# Patient Record
Sex: MALE | Race: WHITE | Employment: OTHER | URBAN - METROPOLITAN AREA
[De-identification: names, ages, dates, MRNs, and addresses within clinical notes are randomized per-mention and may not be internally consistent; named-entity substitution may affect disease eponyms.]

---

## 2017-11-29 ENCOUNTER — HOSPITAL ENCOUNTER (OUTPATIENT)
Dept: GENERAL RADIOLOGY | Age: 72
Discharge: HOME | End: 2017-11-29
Payer: MEDICARE

## 2017-11-29 ENCOUNTER — HOSPITAL ENCOUNTER (OUTPATIENT)
Dept: PREADMISSION TESTING | Age: 72
Discharge: HOME | End: 2017-11-29
Payer: MEDICARE

## 2017-11-29 VITALS
RESPIRATION RATE: 17 BRPM | SYSTOLIC BLOOD PRESSURE: 123 MMHG | BODY MASS INDEX: 29.9 KG/M2 | DIASTOLIC BLOOD PRESSURE: 61 MMHG | WEIGHT: 197.31 LBS | HEIGHT: 68 IN | TEMPERATURE: 97.5 F | OXYGEN SATURATION: 96 % | HEART RATE: 64 BPM

## 2017-11-29 LAB
-: NORMAL
ABO/RH: NORMAL
ABSOLUTE EOS #: 0.1 K/UL (ref 0–0.4)
ABSOLUTE IMMATURE GRANULOCYTE: ABNORMAL K/UL (ref 0–0.3)
ABSOLUTE LYMPH #: 1.4 K/UL (ref 1–4.8)
ABSOLUTE MONO #: 0.6 K/UL (ref 0.2–0.8)
AMORPHOUS: NORMAL
ANION GAP SERPL CALCULATED.3IONS-SCNC: 13 MMOL/L (ref 9–17)
ANTIBODY SCREEN: NEGATIVE
ARM BAND NUMBER: NORMAL
BACTERIA: NORMAL
BASOPHILS # BLD: 0 % (ref 0–2)
BASOPHILS ABSOLUTE: 0 K/UL (ref 0–0.2)
BILIRUBIN URINE: NEGATIVE
BUN BLDV-MCNC: 20 MG/DL (ref 8–23)
BUN/CREAT BLD: 20 (ref 9–20)
CALCIUM SERPL-MCNC: 9.2 MG/DL (ref 8.6–10.4)
CASTS UA: NORMAL /LPF
CHLORIDE BLD-SCNC: 102 MMOL/L (ref 98–107)
CO2: 23 MMOL/L (ref 20–31)
COLOR: YELLOW
COMMENT UA: ABNORMAL
CREAT SERPL-MCNC: 1 MG/DL (ref 0.7–1.2)
CRYSTALS, UA: NORMAL /HPF
DIFFERENTIAL TYPE: ABNORMAL
EKG ATRIAL RATE: 52 BPM
EKG P AXIS: 46 DEGREES
EKG P-R INTERVAL: 162 MS
EKG Q-T INTERVAL: 422 MS
EKG QRS DURATION: 90 MS
EKG QTC CALCULATION (BAZETT): 392 MS
EKG R AXIS: 40 DEGREES
EKG T AXIS: 41 DEGREES
EKG VENTRICULAR RATE: 52 BPM
EOSINOPHILS RELATIVE PERCENT: 2 % (ref 1–4)
EPITHELIAL CELLS UA: NORMAL /HPF (ref 0–5)
EXPIRATION DATE: NORMAL
GFR AFRICAN AMERICAN: >60 ML/MIN
GFR NON-AFRICAN AMERICAN: >60 ML/MIN
GFR SERPL CREATININE-BSD FRML MDRD: ABNORMAL ML/MIN/{1.73_M2}
GFR SERPL CREATININE-BSD FRML MDRD: ABNORMAL ML/MIN/{1.73_M2}
GLUCOSE BLD-MCNC: 139 MG/DL (ref 70–99)
GLUCOSE URINE: NEGATIVE
HCT VFR BLD CALC: 51.3 % (ref 41–53)
HEMOGLOBIN: 17.3 G/DL (ref 13.5–17.5)
IMMATURE GRANULOCYTES: ABNORMAL %
INR BLD: 1
KETONES, URINE: NEGATIVE
LEUKOCYTE ESTERASE, URINE: NEGATIVE
LYMPHOCYTES # BLD: 21 % (ref 24–44)
MCH RBC QN AUTO: 29.8 PG (ref 26–34)
MCHC RBC AUTO-ENTMCNC: 33.8 G/DL (ref 31–37)
MCV RBC AUTO: 88.1 FL (ref 80–100)
MONOCYTES # BLD: 9 % (ref 1–7)
MRSA, DNA, NASAL: NORMAL
MUCUS: NORMAL
NITRITE, URINE: NEGATIVE
OTHER OBSERVATIONS UA: NORMAL
PARTIAL THROMBOPLASTIN TIME: 25.9 SEC (ref 23–31)
PDW BLD-RTO: 13.6 % (ref 11.5–14.5)
PH UA: 6 (ref 5–8)
PLATELET # BLD: 186 K/UL (ref 130–400)
PLATELET ESTIMATE: ABNORMAL
PMV BLD AUTO: 8.1 FL (ref 6–12)
POTASSIUM SERPL-SCNC: 3.3 MMOL/L (ref 3.7–5.3)
PREALBUMIN: 28.5 MG/DL (ref 20–40)
PROTEIN UA: NEGATIVE
PROTHROMBIN TIME: 10.6 SEC (ref 9.7–11.6)
RBC # BLD: 5.82 M/UL (ref 4.5–5.9)
RBC # BLD: ABNORMAL 10*6/UL
RBC UA: NORMAL /HPF (ref 0–2)
RENAL EPITHELIAL, UA: NORMAL /HPF
SEG NEUTROPHILS: 68 % (ref 36–66)
SEGMENTED NEUTROPHILS ABSOLUTE COUNT: 4.5 K/UL (ref 1.8–7.7)
SODIUM BLD-SCNC: 138 MMOL/L (ref 135–144)
SPECIFIC GRAVITY UA: 1.01 (ref 1–1.03)
SPECIMEN DESCRIPTION: NORMAL
TRICHOMONAS: NORMAL
TURBIDITY: CLEAR
URINE HGB: ABNORMAL
UROBILINOGEN, URINE: NORMAL
WBC # BLD: 6.6 K/UL (ref 3.5–11)
WBC # BLD: ABNORMAL 10*3/UL
WBC UA: NORMAL /HPF (ref 0–5)
YEAST: NORMAL

## 2017-11-29 PROCEDURE — 84134 ASSAY OF PREALBUMIN: CPT

## 2017-11-29 PROCEDURE — 87641 MR-STAPH DNA AMP PROBE: CPT

## 2017-11-29 PROCEDURE — 87086 URINE CULTURE/COLONY COUNT: CPT

## 2017-11-29 PROCEDURE — 86850 RBC ANTIBODY SCREEN: CPT

## 2017-11-29 PROCEDURE — 85025 COMPLETE CBC W/AUTO DIFF WBC: CPT

## 2017-11-29 PROCEDURE — 80048 BASIC METABOLIC PNL TOTAL CA: CPT

## 2017-11-29 PROCEDURE — 36415 COLL VENOUS BLD VENIPUNCTURE: CPT

## 2017-11-29 PROCEDURE — 71020 XR CHEST STANDARD TWO VW: CPT

## 2017-11-29 PROCEDURE — 85730 THROMBOPLASTIN TIME PARTIAL: CPT

## 2017-11-29 PROCEDURE — 85610 PROTHROMBIN TIME: CPT

## 2017-11-29 PROCEDURE — 81001 URINALYSIS AUTO W/SCOPE: CPT

## 2017-11-29 PROCEDURE — 86900 BLOOD TYPING SEROLOGIC ABO: CPT

## 2017-11-29 PROCEDURE — 86901 BLOOD TYPING SEROLOGIC RH(D): CPT

## 2017-11-29 PROCEDURE — 93005 ELECTROCARDIOGRAM TRACING: CPT

## 2017-11-29 RX ORDER — FENOFIBRATE 134 MG/1
134 CAPSULE ORAL
COMMUNITY

## 2017-11-29 RX ORDER — ESOMEPRAZOLE MAGNESIUM 40 MG/1
40 FOR SUSPENSION ORAL DAILY
COMMUNITY

## 2017-11-29 RX ORDER — OLMESARTAN MEDOXOMIL, AMLODIPINE AND HYDROCHLOROTHIAZIDE TABLET 40/10/25 MG 40; 10; 25 MG/1; MG/1; MG/1
40 TABLET ORAL
COMMUNITY

## 2017-11-29 NOTE — H&P
feeling in head. Cloudy feeling.  Vicodin [Hydrocodone-Acetaminophen] Other (See Comments)     Cloudiness, does not like feeling. ROS:    GENERAL HEALTH:  Denies any problems with weight, appetite, or sleep. Skin:  No itching or rashes. No lesions. No easy bruising or bleeding. HEENT:  Denies cephalgia or head injury. No eye or ear pain. No sinusitis, rhinorhea or epistaxis. No frequent sorethroat, dysphagia or hoarseness. No masses, pain, stiffness or injury to the neck. Cardio-Respiratory: No hemoptysis, shortness of breath, chest pain, dizziness, orthopnea or palpitations. Gastrointestinal:  No constipation, diarrhea, hiwot stools, red or black in the stool. No nausea or vomiting. Genitourinary:  No dysuria, hematuria, discharge, incontinence. No recent urinary tract infection. Locomotor:  See HPI  No need for assistance with ambulation. Neuro:  Denies  neuropathy, syncopal episodes, weakness, vertigo, arthralgia, myalgia or numbness or tingling. Behavioral/Psych:  Pt denies current feeling of depression or significant anxiety. GENERAL PHYSICAL EXAM:            Vitals: /61   Pulse 64   Temp 97.5 °F (36.4 °C) (Oral)   Resp 17   Ht 5' 8\" (1.727 m)   Wt 197 lb 5 oz (89.5 kg)   SpO2 96%   BMI 30.00 kg/m²  Body mass index is 30 kg/m². GENERAL APPEARANCE:  Kellen Caruso is a 67 y.o. male,  nourished, conscious, alert. Does not appear to be in distress or pain at this time. Skin:  Appears warm and dry without jaundice or cyanosis. No rashes or lesions. HEENT:  No facial swelling or tenderness. No  scleral icterus. No drainage from the ears, eyes or nose. Moist mucous membranes. No jugular vein distention. Carotid pulses present without bruit. Chest:  Symmetrical with equal expansion. Heart:  Regular rate and rhythm without murmur or rub. No extra heart sounds.     Lungs: Clear to ausculation without

## 2017-11-29 NOTE — PRE-PROCEDURE INSTRUCTIONS
ARRIVE AT [facility] ON Tuesday, 12/12/17 at 5:30 AM    Once you enter the hospital lobby, take the elevators to the second floor. Check-In is at the surgery registration desk. If you have been given a blood band, you must bring it with you the day of surgery. Continue to take your home medications as you normally do up to and including the night before surgery with the exception of any blood thinning medications. Please stop any blood thinning medications as directed by your surgeon or prescribing physician. Failure to stop certain medications may interfere with your scheduled surgery. These may include:  Aspirin, Warfarin (Coumadin), Clopidogrel (Plavix), Ibuprofen (Motrin, Advil), Naproxen (Aleve), Meloxicam (Mobic), Celecoxib (Celebrex), Eliquis, Pradaxa, Xarelto, Effient, Fish Oil, Herbal supplements. If you are diabetic, do not take any of your diabetic medications by mouth the morning of surgery. If you are taking insulin contact the doctor that manages your diabetes for instructions about any changes to your insulin dosages the day before surgery. Do not inject insulin or other injectable diabetic medications the morning of surgery unless otherwise instructed by the doctor who manages your diabetes. Please take the following medication(s) the day of surgery with a small sip of water:  Nexium. Tylenol is okay. Please use your inhalers at home the day of surgery. PREPARING FOR YOUR SURGERY:     Before surgery, you can play an important role in your own health. Because skin is not sterile, we need to be sure that your skin is as free of germs as possible before surgery by carefully washing before surgery. Preparing or prepping skin before surgery can reduce the risk of a surgical site infection.   Do not shave the area of your body where your surgery will be performed unless you received specific permission from your physician.     You will need to shower at home the night medications you take, along with the dose of the medications and how often you take it. If more convenient bring the pharmacy bottles in a zip lock bag.  Brush your teeth but do not swallow water.  Bring your eyeglasses and case with you. No contacts are to be worn the day of surgery. You also may bring your hearing aids.  If you are on C-PAP or Bi-PAP at home and plan on staying in the hospital overnight for your surgery please bring the machine with you. · Do not wear any jewelry or body piercings day of surgery. Also, NO lotion, perfume or deodorant to be used the day of surgery. No nail polish on the operative extremity (arm/leg surgeries)    · Do not bring any valuables such as jewelry, cash, or credit cards. If you are staying overnight with us, please bring a small bag of personal items.  Please wear loose, comfortable clothing. If you are potentially going to have a cast or brace bring clothing that will fit over them.  In case of illness - If you have cold or flu like symptoms (high fever, runny nose, sore throat, cough, etc.) rash, nausea, vomiting, loose stools, and/or recent contact with someone who has a contagious disease (chicken pox, measles, etc.) Please call your doctor before coming to the hospital.     If your child is having surgery please make arrangements for any other children to be cared for at home on the day of surgery. Other children are not permitted in recovery room and we want you to be able to spend time with the patient. If other arrangements are not available then we suggest that you have a second adult to stay in the waiting room. Day of Surgery/Procedure:    As a patient at [facility] you can expect quality medical and nursing care that is centered on your individual needs.   Our goal is to make your surgical experience as

## 2017-11-30 ENCOUNTER — ANESTHESIA EVENT (OUTPATIENT)
Dept: OPERATING ROOM | Age: 72
DRG: 470 | End: 2017-11-30
Payer: MEDICARE

## 2017-11-30 LAB
CULTURE: NORMAL
CULTURE: NORMAL
Lab: NORMAL
SPECIMEN DESCRIPTION: NORMAL
SPECIMEN DESCRIPTION: NORMAL
STATUS: NORMAL

## 2017-12-01 ASSESSMENT — KOOS JR
STANDING UPRIGHT: 1
TWISING OR PIVOTING ON KNEE: 1
STRAIGHTENING KNEE FULLY: 0
RISING FROM SITTING: 1
GOING UP OR DOWN STAIRS: 1
HOW SEVERE IS YOUR KNEE STIFFNESS AFTER FIRST WAKING IN MORNING: 1
BENDING TO THE FLOOR TO PICK UP OBJECT: 0

## 2017-12-01 ASSESSMENT — PROMIS GLOBAL HEALTH SCALE
TO WHAT EXTENT ARE YOU ABLE TO CARRY OUT YOUR EVERYDAY PHYSICAL ACTIVITIES SUCH AS WALKING, CLIMBING STAIRS, CARRYING GROCERIES, OR MOVING A CHAIR [ON A SCALE OF 1 (NOT AT ALL) TO 5 (COMPLETELY)]?: 5
WHO IS THE PERSON COMPLETING THE PROMIS V1.1 SURVEY?: 0
IN GENERAL, HOW WOULD YOU RATE YOUR PHYSICAL HEALTH [ON A SCALE OF 1 (POOR) TO 5 (EXCELLENT)]?: 4
IN THE PAST 7 DAYS, HOW WOULD YOU RATE YOUR FATIGUE ON AVERAGE [ON A SCALE FROM 1 (NONE) TO 5 (VERY SEVERE)]?: 4
IN GENERAL, HOW WOULD YOU RATE YOUR SATISFACTION WITH YOUR SOCIAL ACTIVITIES AND RELATIONSHIPS [ON A SCALE OF 1 (POOR) TO 5 (EXCELLENT)]?: 4
SUM OF RESPONSES TO QUESTIONS 3, 6, 7, & 8: 17
IN GENERAL, HOW WOULD YOU RATE YOUR MENTAL HEALTH, INCLUDING YOUR MOOD AND YOUR ABILITY TO THINK [ON A SCALE OF 1 (POOR) TO 5 (EXCELLENT)]?: 4
IN THE PAST 7 DAYS, HOW WOULD YOU RATE YOUR PAIN ON AVERAGE [ON A SCALE FROM 0 (NO PAIN) TO 10 (WORST IMAGINABLE PAIN)]?: 4
HOW IS THE PROMIS V1.1 BEING ADMINISTERED?: 0
IN GENERAL, WOULD YOU SAY YOUR HEALTH IS...[ON A SCALE OF 1 (POOR) TO 5 (EXCELLENT)]: 4
IN GENERAL, PLEASE RATE HOW WELL YOU CARRY OUT YOUR USUAL SOCIAL ACTIVITIES (INCLUDES ACTIVITIES AT HOME, AT WORK, AND IN YOUR COMMUNITY, AND RESPONSIBILITIES AS A PARENT, CHILD, SPOUSE, EMPLOYEE, FRIEND, ETC) [ON A SCALE OF 1 (POOR) TO 5 (EXCELLENT)]?: 4
IN GENERAL, WOULD YOU SAY YOUR QUALITY OF LIFE IS...[ON A SCALE OF 1 (POOR) TO 5 (EXCELLENT)]: 4
IN THE PAST 7 DAYS, HOW OFTEN HAVE YOU BEEN BOTHERED BY EMOTIONAL PROBLEMS, SUCH AS FEELING ANXIOUS, DEPRESSED, OR IRRITABLE [ON A SCALE FROM 1 (NEVER) TO 5 (ALWAYS)]?: 2
SUM OF RESPONSES TO QUESTIONS 2, 4, 5, & 10: 14

## 2017-12-12 ENCOUNTER — HOSPITAL ENCOUNTER (INPATIENT)
Age: 72
LOS: 2 days | Discharge: HOME | DRG: 470 | End: 2017-12-14
Attending: ORTHOPAEDIC SURGERY | Admitting: ORTHOPAEDIC SURGERY
Payer: MEDICARE

## 2017-12-12 ENCOUNTER — APPOINTMENT (OUTPATIENT)
Dept: GENERAL RADIOLOGY | Age: 72
DRG: 470 | End: 2017-12-12
Attending: ORTHOPAEDIC SURGERY
Payer: MEDICARE

## 2017-12-12 ENCOUNTER — ANESTHESIA (OUTPATIENT)
Dept: OPERATING ROOM | Age: 72
DRG: 470 | End: 2017-12-12
Payer: MEDICARE

## 2017-12-12 VITALS — SYSTOLIC BLOOD PRESSURE: 114 MMHG | DIASTOLIC BLOOD PRESSURE: 64 MMHG | OXYGEN SATURATION: 96 % | TEMPERATURE: 96.8 F

## 2017-12-12 PROBLEM — Z96.652 S/P TOTAL KNEE ARTHROPLASTY, LEFT: Status: ACTIVE | Noted: 2017-12-12

## 2017-12-12 LAB — POTASSIUM SERPL-SCNC: 4.1 MMOL/L (ref 3.7–5.3)

## 2017-12-12 PROCEDURE — 97162 PT EVAL MOD COMPLEX 30 MIN: CPT

## 2017-12-12 PROCEDURE — 0SRD069 REPLACEMENT OF LEFT KNEE JOINT WITH OXIDIZED ZIRCONIUM ON POLYETHYLENE SYNTHETIC SUBSTITUTE, CEMENTED, OPEN APPROACH: ICD-10-PCS | Performed by: ORTHOPAEDIC SURGERY

## 2017-12-12 PROCEDURE — 84132 ASSAY OF SERUM POTASSIUM: CPT

## 2017-12-12 PROCEDURE — G8979 MOBILITY GOAL STATUS: HCPCS

## 2017-12-12 PROCEDURE — 2580000003 HC RX 258: Performed by: STUDENT IN AN ORGANIZED HEALTH CARE EDUCATION/TRAINING PROGRAM

## 2017-12-12 PROCEDURE — C1776 JOINT DEVICE (IMPLANTABLE): HCPCS | Performed by: ORTHOPAEDIC SURGERY

## 2017-12-12 PROCEDURE — 97165 OT EVAL LOW COMPLEX 30 MIN: CPT

## 2017-12-12 PROCEDURE — 99232 SBSQ HOSP IP/OBS MODERATE 35: CPT | Performed by: INTERNAL MEDICINE

## 2017-12-12 PROCEDURE — 64448 NJX AA&/STRD FEM NRV NFS IMG: CPT | Performed by: ANESTHESIOLOGY

## 2017-12-12 PROCEDURE — 6370000000 HC RX 637 (ALT 250 FOR IP): Performed by: ANESTHESIOLOGY

## 2017-12-12 PROCEDURE — 2720000010 HC SURG SUPPLY STERILE: Performed by: ORTHOPAEDIC SURGERY

## 2017-12-12 PROCEDURE — 97535 SELF CARE MNGMENT TRAINING: CPT

## 2017-12-12 PROCEDURE — 3700000001 HC ADD 15 MINUTES (ANESTHESIA): Performed by: ORTHOPAEDIC SURGERY

## 2017-12-12 PROCEDURE — 6360000002 HC RX W HCPCS: Performed by: ANESTHESIOLOGY

## 2017-12-12 PROCEDURE — 7100000000 HC PACU RECOVERY - FIRST 15 MIN: Performed by: ORTHOPAEDIC SURGERY

## 2017-12-12 PROCEDURE — 2500000003 HC RX 250 WO HCPCS: Performed by: ANESTHESIOLOGY

## 2017-12-12 PROCEDURE — 97530 THERAPEUTIC ACTIVITIES: CPT

## 2017-12-12 PROCEDURE — 2580000003 HC RX 258: Performed by: ANESTHESIOLOGY

## 2017-12-12 PROCEDURE — 3600000005 HC SURGERY LEVEL 5 BASE: Performed by: ORTHOPAEDIC SURGERY

## 2017-12-12 PROCEDURE — 3E0T3BZ INTRODUCTION OF ANESTHETIC AGENT INTO PERIPHERAL NERVES AND PLEXI, PERCUTANEOUS APPROACH: ICD-10-PCS | Performed by: ANESTHESIOLOGY

## 2017-12-12 PROCEDURE — 7100000001 HC PACU RECOVERY - ADDTL 15 MIN: Performed by: ORTHOPAEDIC SURGERY

## 2017-12-12 PROCEDURE — C1713 ANCHOR/SCREW BN/BN,TIS/BN: HCPCS | Performed by: ORTHOPAEDIC SURGERY

## 2017-12-12 PROCEDURE — 3700000000 HC ANESTHESIA ATTENDED CARE: Performed by: ORTHOPAEDIC SURGERY

## 2017-12-12 PROCEDURE — A4364 ADHESIVE, LIQUID OR EQUAL: HCPCS | Performed by: ORTHOPAEDIC SURGERY

## 2017-12-12 PROCEDURE — 6360000002 HC RX W HCPCS: Performed by: ORTHOPAEDIC SURGERY

## 2017-12-12 PROCEDURE — 3600000015 HC SURGERY LEVEL 5 ADDTL 15MIN: Performed by: ORTHOPAEDIC SURGERY

## 2017-12-12 PROCEDURE — 6370000000 HC RX 637 (ALT 250 FOR IP): Performed by: STUDENT IN AN ORGANIZED HEALTH CARE EDUCATION/TRAINING PROGRAM

## 2017-12-12 PROCEDURE — G8987 SELF CARE CURRENT STATUS: HCPCS

## 2017-12-12 PROCEDURE — 1200000000 HC SEMI PRIVATE

## 2017-12-12 PROCEDURE — 73560 X-RAY EXAM OF KNEE 1 OR 2: CPT

## 2017-12-12 PROCEDURE — 2580000003 HC RX 258: Performed by: ORTHOPAEDIC SURGERY

## 2017-12-12 PROCEDURE — 6370000000 HC RX 637 (ALT 250 FOR IP): Performed by: INTERNAL MEDICINE

## 2017-12-12 PROCEDURE — 6360000002 HC RX W HCPCS: Performed by: STUDENT IN AN ORGANIZED HEALTH CARE EDUCATION/TRAINING PROGRAM

## 2017-12-12 PROCEDURE — G8978 MOBILITY CURRENT STATUS: HCPCS

## 2017-12-12 PROCEDURE — G8988 SELF CARE GOAL STATUS: HCPCS

## 2017-12-12 PROCEDURE — 97110 THERAPEUTIC EXERCISES: CPT

## 2017-12-12 DEVICE — JOURNEY II BCS FEMORAL OXINIUM                                    LEFT SIZE 6
Type: IMPLANTABLE DEVICE | Site: KNEE | Status: FUNCTIONAL
Brand: JOURNEY

## 2017-12-12 DEVICE — JOURNEY II BCS XLPE ARTICULAR                                    INSERT SIZE 5-6 LEFT 10MM
Type: IMPLANTABLE DEVICE | Site: KNEE | Status: FUNCTIONAL
Brand: JOURNEY

## 2017-12-12 DEVICE — GENESIS II OVAL RESURFACING                                    PATELLAR 35MM
Type: IMPLANTABLE DEVICE | Site: KNEE | Status: FUNCTIONAL
Brand: GENESIS II

## 2017-12-12 DEVICE — CEMENT BNE 40GM FULL DOSE PMMA W/O ANTIBIO HI VISC N RADPQ: Type: IMPLANTABLE DEVICE | Site: KNEE | Status: FUNCTIONAL

## 2017-12-12 DEVICE — JOURNEY TIBIAL BASEPLATE NONPOROUS                                    LEFT SIZE 6
Type: IMPLANTABLE DEVICE | Site: KNEE | Status: FUNCTIONAL
Brand: JOURNEY

## 2017-12-12 RX ORDER — OLMESARTAN MEDOXOMIL, AMLODIPINE AND HYDROCHLOROTHIAZIDE TABLET 40/10/25 MG 40; 10; 25 MG/1; MG/1; MG/1
40 TABLET ORAL 2 TIMES DAILY
Status: DISCONTINUED | OUTPATIENT
Start: 2017-12-12 | End: 2017-12-12

## 2017-12-12 RX ORDER — GLYCOPYRROLATE 0.2 MG/ML
INJECTION INTRAMUSCULAR; INTRAVENOUS PRN
Status: DISCONTINUED | OUTPATIENT
Start: 2017-12-12 | End: 2017-12-12 | Stop reason: SDUPTHER

## 2017-12-12 RX ORDER — SODIUM CHLORIDE, SODIUM LACTATE, POTASSIUM CHLORIDE, CALCIUM CHLORIDE 600; 310; 30; 20 MG/100ML; MG/100ML; MG/100ML; MG/100ML
INJECTION, SOLUTION INTRAVENOUS CONTINUOUS
Status: DISCONTINUED | OUTPATIENT
Start: 2017-12-13 | End: 2017-12-12

## 2017-12-12 RX ORDER — GABAPENTIN 300 MG/1
300 CAPSULE ORAL ONCE
Status: COMPLETED | OUTPATIENT
Start: 2017-12-12 | End: 2017-12-12

## 2017-12-12 RX ORDER — ROPIVACAINE HYDROCHLORIDE 5 MG/ML
INJECTION, SOLUTION EPIDURAL; INFILTRATION; PERINEURAL PRN
Status: DISCONTINUED | OUTPATIENT
Start: 2017-12-12 | End: 2017-12-12 | Stop reason: SDUPTHER

## 2017-12-12 RX ORDER — HYDRALAZINE HYDROCHLORIDE 20 MG/ML
5 INJECTION INTRAMUSCULAR; INTRAVENOUS EVERY 10 MIN PRN
Status: DISCONTINUED | OUTPATIENT
Start: 2017-12-12 | End: 2017-12-12 | Stop reason: HOSPADM

## 2017-12-12 RX ORDER — LIDOCAINE HYDROCHLORIDE 20 MG/ML
INJECTION, SOLUTION EPIDURAL; INFILTRATION; INTRACAUDAL; PERINEURAL PRN
Status: DISCONTINUED | OUTPATIENT
Start: 2017-12-12 | End: 2017-12-12 | Stop reason: SDUPTHER

## 2017-12-12 RX ORDER — PROMETHAZINE HYDROCHLORIDE 25 MG/ML
6.25 INJECTION, SOLUTION INTRAMUSCULAR; INTRAVENOUS
Status: DISCONTINUED | OUTPATIENT
Start: 2017-12-12 | End: 2017-12-12 | Stop reason: HOSPADM

## 2017-12-12 RX ORDER — ACETAMINOPHEN 500 MG
1000 TABLET ORAL ONCE
Status: COMPLETED | OUTPATIENT
Start: 2017-12-12 | End: 2017-12-12

## 2017-12-12 RX ORDER — OLMESARTAN MEDOXOMIL, AMLODIPINE AND HYDROCHLOROTHIAZIDE TABLET 40/10/25 MG 40; 10; 25 MG/1; MG/1; MG/1
40 TABLET ORAL DAILY
Status: DISCONTINUED | OUTPATIENT
Start: 2017-12-12 | End: 2017-12-12 | Stop reason: CLARIF

## 2017-12-12 RX ORDER — GABAPENTIN 100 MG/1
100 CAPSULE ORAL 3 TIMES DAILY
Status: DISCONTINUED | OUTPATIENT
Start: 2017-12-12 | End: 2017-12-14 | Stop reason: HOSPADM

## 2017-12-12 RX ORDER — PROPOFOL 10 MG/ML
INJECTION, EMULSION INTRAVENOUS CONTINUOUS PRN
Status: DISCONTINUED | OUTPATIENT
Start: 2017-12-12 | End: 2017-12-12 | Stop reason: SDUPTHER

## 2017-12-12 RX ORDER — AMLODIPINE BESYLATE 10 MG/1
10 TABLET ORAL DAILY
Status: DISCONTINUED | OUTPATIENT
Start: 2017-12-12 | End: 2017-12-14 | Stop reason: HOSPADM

## 2017-12-12 RX ORDER — LABETALOL HYDROCHLORIDE 5 MG/ML
5 INJECTION, SOLUTION INTRAVENOUS EVERY 10 MIN PRN
Status: DISCONTINUED | OUTPATIENT
Start: 2017-12-12 | End: 2017-12-12 | Stop reason: HOSPADM

## 2017-12-12 RX ORDER — ACETAMINOPHEN 500 MG
1000 TABLET ORAL EVERY 6 HOURS
Status: DISCONTINUED | OUTPATIENT
Start: 2017-12-12 | End: 2017-12-14 | Stop reason: HOSPADM

## 2017-12-12 RX ORDER — BISACODYL 10 MG
10 SUPPOSITORY, RECTAL RECTAL DAILY PRN
Status: DISCONTINUED | OUTPATIENT
Start: 2017-12-12 | End: 2017-12-14 | Stop reason: HOSPADM

## 2017-12-12 RX ORDER — METHYLENE BLUE 10 MG/ML
INJECTION INTRAVENOUS PRN
Status: DISCONTINUED | OUTPATIENT
Start: 2017-12-12 | End: 2017-12-12 | Stop reason: HOSPADM

## 2017-12-12 RX ORDER — FENOFIBRATE 160 MG/1
160 TABLET ORAL DAILY
Status: DISCONTINUED | OUTPATIENT
Start: 2017-12-13 | End: 2017-12-14 | Stop reason: HOSPADM

## 2017-12-12 RX ORDER — FENTANYL CITRATE 50 UG/ML
50 INJECTION, SOLUTION INTRAMUSCULAR; INTRAVENOUS
Status: DISCONTINUED | OUTPATIENT
Start: 2017-12-12 | End: 2017-12-14 | Stop reason: HOSPADM

## 2017-12-12 RX ORDER — LIDOCAINE HYDROCHLORIDE 10 MG/ML
1 INJECTION, SOLUTION EPIDURAL; INFILTRATION; INTRACAUDAL; PERINEURAL
Status: DISCONTINUED | OUTPATIENT
Start: 2017-12-13 | End: 2017-12-12 | Stop reason: HOSPADM

## 2017-12-12 RX ORDER — HYDROMORPHONE HCL 110MG/55ML
0.5 PATIENT CONTROLLED ANALGESIA SYRINGE INTRAVENOUS EVERY 5 MIN PRN
Status: DISCONTINUED | OUTPATIENT
Start: 2017-12-12 | End: 2017-12-12 | Stop reason: HOSPADM

## 2017-12-12 RX ORDER — FENTANYL CITRATE 50 UG/ML
25 INJECTION, SOLUTION INTRAMUSCULAR; INTRAVENOUS
Status: DISCONTINUED | OUTPATIENT
Start: 2017-12-12 | End: 2017-12-12

## 2017-12-12 RX ORDER — SODIUM CHLORIDE 9 MG/ML
INJECTION, SOLUTION INTRAVENOUS CONTINUOUS
Status: DISCONTINUED | OUTPATIENT
Start: 2017-12-12 | End: 2017-12-14 | Stop reason: HOSPADM

## 2017-12-12 RX ORDER — SODIUM CHLORIDE 9 MG/ML
INJECTION, SOLUTION INTRAVENOUS CONTINUOUS
Status: DISCONTINUED | OUTPATIENT
Start: 2017-12-13 | End: 2017-12-12

## 2017-12-12 RX ORDER — FENOFIBRATE 134 MG/1
134 CAPSULE ORAL
Status: DISCONTINUED | OUTPATIENT
Start: 2017-12-13 | End: 2017-12-12 | Stop reason: CLARIF

## 2017-12-12 RX ORDER — FENTANYL CITRATE 50 UG/ML
25 INJECTION, SOLUTION INTRAMUSCULAR; INTRAVENOUS EVERY 5 MIN PRN
Status: DISCONTINUED | OUTPATIENT
Start: 2017-12-12 | End: 2017-12-12 | Stop reason: HOSPADM

## 2017-12-12 RX ORDER — SODIUM CHLORIDE 0.9 % (FLUSH) 0.9 %
10 SYRINGE (ML) INJECTION EVERY 12 HOURS SCHEDULED
Status: DISCONTINUED | OUTPATIENT
Start: 2017-12-12 | End: 2017-12-14 | Stop reason: HOSPADM

## 2017-12-12 RX ORDER — METOPROLOL TARTRATE 5 MG/5ML
5 INJECTION INTRAVENOUS EVERY 6 HOURS PRN
Status: DISCONTINUED | OUTPATIENT
Start: 2017-12-12 | End: 2017-12-14 | Stop reason: HOSPADM

## 2017-12-12 RX ORDER — CELECOXIB 200 MG/1
200 CAPSULE ORAL ONCE
Status: COMPLETED | OUTPATIENT
Start: 2017-12-12 | End: 2017-12-12

## 2017-12-12 RX ORDER — LOSARTAN POTASSIUM 100 MG/1
100 TABLET ORAL DAILY
Status: DISCONTINUED | OUTPATIENT
Start: 2017-12-12 | End: 2017-12-14 | Stop reason: HOSPADM

## 2017-12-12 RX ORDER — SODIUM CHLORIDE 0.9 % (FLUSH) 0.9 %
10 SYRINGE (ML) INJECTION EVERY 12 HOURS SCHEDULED
Status: DISCONTINUED | OUTPATIENT
Start: 2017-12-12 | End: 2017-12-12 | Stop reason: HOSPADM

## 2017-12-12 RX ORDER — SODIUM CHLORIDE 0.9 % (FLUSH) 0.9 %
10 SYRINGE (ML) INJECTION PRN
Status: DISCONTINUED | OUTPATIENT
Start: 2017-12-12 | End: 2017-12-14 | Stop reason: HOSPADM

## 2017-12-12 RX ORDER — FENTANYL CITRATE 50 UG/ML
INJECTION, SOLUTION INTRAMUSCULAR; INTRAVENOUS PRN
Status: DISCONTINUED | OUTPATIENT
Start: 2017-12-12 | End: 2017-12-12 | Stop reason: SDUPTHER

## 2017-12-12 RX ORDER — TRANEXAMIC ACID 100 MG/ML
INJECTION, SOLUTION INTRAVENOUS PRN
Status: DISCONTINUED | OUTPATIENT
Start: 2017-12-12 | End: 2017-12-12 | Stop reason: SDUPTHER

## 2017-12-12 RX ORDER — LIDOCAINE HYDROCHLORIDE 10 MG/ML
INJECTION, SOLUTION EPIDURAL; INFILTRATION; INTRACAUDAL; PERINEURAL PRN
Status: DISCONTINUED | OUTPATIENT
Start: 2017-12-12 | End: 2017-12-12 | Stop reason: SDUPTHER

## 2017-12-12 RX ORDER — PROPOFOL 10 MG/ML
INJECTION, EMULSION INTRAVENOUS PRN
Status: DISCONTINUED | OUTPATIENT
Start: 2017-12-12 | End: 2017-12-12 | Stop reason: SDUPTHER

## 2017-12-12 RX ORDER — HYDROCHLOROTHIAZIDE 25 MG/1
25 TABLET ORAL DAILY
Status: DISCONTINUED | OUTPATIENT
Start: 2017-12-12 | End: 2017-12-14 | Stop reason: HOSPADM

## 2017-12-12 RX ORDER — ONDANSETRON 2 MG/ML
4 INJECTION INTRAMUSCULAR; INTRAVENOUS
Status: DISCONTINUED | OUTPATIENT
Start: 2017-12-12 | End: 2017-12-12 | Stop reason: HOSPADM

## 2017-12-12 RX ORDER — CELECOXIB 200 MG/1
200 CAPSULE ORAL 2 TIMES DAILY
Status: DISCONTINUED | OUTPATIENT
Start: 2017-12-12 | End: 2017-12-14 | Stop reason: HOSPADM

## 2017-12-12 RX ORDER — SODIUM CHLORIDE 0.9 % (FLUSH) 0.9 %
10 SYRINGE (ML) INJECTION PRN
Status: DISCONTINUED | OUTPATIENT
Start: 2017-12-12 | End: 2017-12-12 | Stop reason: HOSPADM

## 2017-12-12 RX ORDER — DOCUSATE SODIUM 100 MG/1
100 CAPSULE, LIQUID FILLED ORAL 2 TIMES DAILY
Status: DISCONTINUED | OUTPATIENT
Start: 2017-12-12 | End: 2017-12-14 | Stop reason: HOSPADM

## 2017-12-12 RX ORDER — ONDANSETRON 2 MG/ML
4 INJECTION INTRAMUSCULAR; INTRAVENOUS EVERY 6 HOURS PRN
Status: DISCONTINUED | OUTPATIENT
Start: 2017-12-12 | End: 2017-12-14 | Stop reason: HOSPADM

## 2017-12-12 RX ORDER — FENTANYL CITRATE 50 UG/ML
25 INJECTION, SOLUTION INTRAMUSCULAR; INTRAVENOUS
Status: DISCONTINUED | OUTPATIENT
Start: 2017-12-12 | End: 2017-12-14 | Stop reason: HOSPADM

## 2017-12-12 RX ORDER — BUPIVACAINE HYDROCHLORIDE 7.5 MG/ML
INJECTION, SOLUTION INTRASPINAL PRN
Status: DISCONTINUED | OUTPATIENT
Start: 2017-12-12 | End: 2017-12-12 | Stop reason: SDUPTHER

## 2017-12-12 RX ORDER — OLMESARTAN MEDOXOMIL, AMLODIPINE AND HYDROCHLOROTHIAZIDE TABLET 40/10/25 MG 40; 10; 25 MG/1; MG/1; MG/1
40 TABLET ORAL 2 TIMES DAILY
Status: DISCONTINUED | OUTPATIENT
Start: 2017-12-12 | End: 2017-12-12 | Stop reason: CLARIF

## 2017-12-12 RX ADMIN — DOCUSATE SODIUM 100 MG: 100 CAPSULE, LIQUID FILLED ORAL at 14:10

## 2017-12-12 RX ADMIN — AMLODIPINE BESYLATE 10 MG: 10 TABLET ORAL at 14:09

## 2017-12-12 RX ADMIN — LIDOCAINE HYDROCHLORIDE 3 ML: 10 INJECTION, SOLUTION EPIDURAL; INFILTRATION; INTRACAUDAL; PERINEURAL at 06:53

## 2017-12-12 RX ADMIN — ACETAMINOPHEN 1000 MG: 500 TABLET ORAL at 14:10

## 2017-12-12 RX ADMIN — RIVAROXABAN 10 MG: 10 TABLET, FILM COATED ORAL at 20:49

## 2017-12-12 RX ADMIN — LIDOCAINE HYDROCHLORIDE 2 ML: 10 INJECTION, SOLUTION EPIDURAL; INFILTRATION; INTRACAUDAL; PERINEURAL at 06:56

## 2017-12-12 RX ADMIN — CEFAZOLIN SODIUM 2 G: 2 SOLUTION INTRAVENOUS at 07:45

## 2017-12-12 RX ADMIN — ROPIVACAINE HYDROCHLORIDE 15 ML: 5 INJECTION, SOLUTION EPIDURAL; INFILTRATION; PERINEURAL at 06:57

## 2017-12-12 RX ADMIN — CELECOXIB 200 MG: 200 CAPSULE ORAL at 11:09

## 2017-12-12 RX ADMIN — LOSARTAN POTASSIUM 100 MG: 100 TABLET, FILM COATED ORAL at 14:09

## 2017-12-12 RX ADMIN — PROPOFOL: 10 INJECTION, EMULSION INTRAVENOUS at 09:35

## 2017-12-12 RX ADMIN — SODIUM CHLORIDE, POTASSIUM CHLORIDE, SODIUM LACTATE AND CALCIUM CHLORIDE: 600; 310; 30; 20 INJECTION, SOLUTION INTRAVENOUS at 08:06

## 2017-12-12 RX ADMIN — GABAPENTIN 100 MG: 100 CAPSULE ORAL at 14:09

## 2017-12-12 RX ADMIN — GABAPENTIN 300 MG: 300 CAPSULE ORAL at 11:09

## 2017-12-12 RX ADMIN — HYDROCHLOROTHIAZIDE 25 MG: 25 TABLET ORAL at 14:09

## 2017-12-12 RX ADMIN — ACETAMINOPHEN 1000 MG: 500 TABLET ORAL at 20:48

## 2017-12-12 RX ADMIN — PROPOFOL 50 MG: 10 INJECTION, EMULSION INTRAVENOUS at 07:44

## 2017-12-12 RX ADMIN — LIDOCAINE HYDROCHLORIDE 50 MG: 20 INJECTION, SOLUTION EPIDURAL; INFILTRATION; INTRACAUDAL; PERINEURAL at 07:44

## 2017-12-12 RX ADMIN — TRANEXAMIC ACID 1000 MG: 100 INJECTION, SOLUTION INTRAVENOUS at 07:56

## 2017-12-12 RX ADMIN — FENTANYL CITRATE 25 MCG: 50 INJECTION, SOLUTION INTRAMUSCULAR; INTRAVENOUS at 06:50

## 2017-12-12 RX ADMIN — SODIUM CHLORIDE: 9 INJECTION, SOLUTION INTRAVENOUS at 14:09

## 2017-12-12 RX ADMIN — PROPOFOL 75 MCG/KG/MIN: 10 INJECTION, EMULSION INTRAVENOUS at 07:44

## 2017-12-12 RX ADMIN — Medication 500 ML: at 10:41

## 2017-12-12 RX ADMIN — CELECOXIB 200 MG: 200 CAPSULE ORAL at 15:21

## 2017-12-12 RX ADMIN — ROPIVACAINE HYDROCHLORIDE 30 ML: 5 INJECTION, SOLUTION EPIDURAL; INFILTRATION; PERINEURAL at 06:54

## 2017-12-12 RX ADMIN — GABAPENTIN 100 MG: 100 CAPSULE ORAL at 20:48

## 2017-12-12 RX ADMIN — Medication 0.2 MG: at 07:28

## 2017-12-12 RX ADMIN — FENTANYL CITRATE 25 MCG: 50 INJECTION, SOLUTION INTRAMUSCULAR; INTRAVENOUS at 07:39

## 2017-12-12 RX ADMIN — SODIUM CHLORIDE: 9 INJECTION, SOLUTION INTRAVENOUS at 20:49

## 2017-12-12 RX ADMIN — SODIUM CHLORIDE, POTASSIUM CHLORIDE, SODIUM LACTATE AND CALCIUM CHLORIDE: 600; 310; 30; 20 INJECTION, SOLUTION INTRAVENOUS at 07:22

## 2017-12-12 RX ADMIN — CEFAZOLIN SODIUM 2 G: 2 SOLUTION INTRAVENOUS at 15:21

## 2017-12-12 RX ADMIN — BUPIVACAINE HYDROCHLORIDE IN DEXTROSE 1.8 ML: 7.5 INJECTION, SOLUTION SUBARACHNOID at 07:39

## 2017-12-12 RX ADMIN — ACETAMINOPHEN 1000 MG: 500 TABLET ORAL at 11:09

## 2017-12-12 ASSESSMENT — PULMONARY FUNCTION TESTS
PIF_VALUE: 0
PIF_VALUE: 1
PIF_VALUE: 0
PIF_VALUE: 1
PIF_VALUE: 0

## 2017-12-12 ASSESSMENT — PAIN SCALES - GENERAL
PAINLEVEL_OUTOF10: 0
PAINLEVEL_OUTOF10: 4
PAINLEVEL_OUTOF10: 0
PAINLEVEL_OUTOF10: 1
PAINLEVEL_OUTOF10: 3
PAINLEVEL_OUTOF10: 1
PAINLEVEL_OUTOF10: 0
PAINLEVEL_OUTOF10: 1
PAINLEVEL_OUTOF10: 0

## 2017-12-12 ASSESSMENT — PAIN DESCRIPTION - PROGRESSION: CLINICAL_PROGRESSION: GRADUALLY IMPROVING

## 2017-12-12 ASSESSMENT — PAIN DESCRIPTION - ORIENTATION
ORIENTATION: LEFT
ORIENTATION: LEFT

## 2017-12-12 ASSESSMENT — PAIN DESCRIPTION - LOCATION
LOCATION: KNEE
LOCATION: KNEE

## 2017-12-12 ASSESSMENT — PAIN DESCRIPTION - PAIN TYPE: TYPE: SURGICAL PAIN

## 2017-12-12 ASSESSMENT — PAIN DESCRIPTION - FREQUENCY: FREQUENCY: INTERMITTENT

## 2017-12-12 ASSESSMENT — PAIN DESCRIPTION - DESCRIPTORS: DESCRIPTORS: SORE

## 2017-12-12 ASSESSMENT — PAIN DESCRIPTION - ONSET: ONSET: ON-GOING

## 2017-12-12 NOTE — PROGRESS NOTES
Occupational Therapy   Occupational Therapy Initial Assessment  Date: 2017   Patient Name: Carmen Bain  MRN: [id number]     : 1945  Discharge Recommendation: home with assist prn/plans on OP PT (may benefit from home OT first)       RN reports patient is medically stable for therapy treatment this date. Chart reviewed prior to treatment and patient is agreeable for therapy. All lines intact and patient positioned comfortably at end of treatment. All patient needs addressed prior to ending therapy session. Patient Diagnosis(es): There were no encounter diagnoses. has a past medical history of Arthritis; Cataract; GERD (gastroesophageal reflux disease); Hypertension; and Wrist fracture, left.   has a past surgical history that includes Foot surgery (Right); Colonoscopy; polypectomy; Endoscopy, colon, diagnostic; Total knee arthroplasty (Left, 2017); joint replacement; and total knee arthroplasty (Left, 2017).            Restrictions  Restrictions/Precautions  Restrictions/Precautions: General Precautions, Fall Risk (knee immobilizer with infublock- pt with very limited sensation)    Subjective   General  Chart Reviewed: Yes  Patient assessed for rehabilitation services?: Yes  Family / Caregiver Present: No  Pain Assessment  Patient Currently in Pain: Yes  Pain Assessment: 0-10  Pain Level: 4  Pain Location: Knee  Pain Orientation: Left     Social/Functional History  Social/Functional History  Lives With: Spouse  Type of Home: House  Home Layout: Two level, Bed/Bath upstairs, 1/2 bath on main level  Home Access: Stairs to enter without rails  Entrance Stairs - Number of Steps: 1+1  Bathroom Shower/Tub: Tub/Shower unit  Bathroom Toilet: Standard  Home Equipment: Cane, Crutches  ADL Assistance: Independent  Homemaking Assistance: Independent  Ambulation Assistance: Independent  Transfer Assistance: Independent  Active : Yes  Occupation: Retired  Type of occupation: physical Recommendations: Home with assist PRN (plans on OP PT; might benefit from [facility] PT/OT first)  REQUIRES OT FOLLOW UP: Yes  Activity Tolerance  Activity Tolerance: Patient Tolerated treatment well  Safety Devices  Safety Devices in place: Yes  Type of devices: Call light within reach;Nurse notified; Left in bed;Gait belt;Patient at risk for falls        Discharge Recommendations:  Home with assist PRN (plans on OP PT; might benefit from [facility] PT/OT first)     Plan   Plan  Times per week: 5x/week, 1-2x/day  Current Treatment Recommendations: Strengthening, Balance Training, Functional Mobility Training, Endurance Training, Self-Care / ADL, Safety Education & Training, Patient/Caregiver Education & Training, Equipment Evaluation, Education, & procurement    G-Code  OT G-codes  Functional Assessment Tool Used: Charlene Lynn \"6 clicks\" Inpatient Daily activity short form  Score: 17  Functional Limitation: Self care  Self Care Current Status ():  At least 40 percent but less than 60 percent impaired, limited or restricted  Self Care Goal Status (): 0 percent impaired, limited or restricted  OutComes Score                                           AM-PAC Score             Goals  Short term goals  Time Frame for Short term goals: by discharge, pt will  Short term goal 1: demo S/MI with ADL transfers with good safety  Short term goal 2: demo S/MI with functional mob for ADL completion with good safety  Short term goal 3: demo S/MI with toileting routine  Short term goal 4: demo I with UB ADLs and min A with LB ADLs with good pacing and safety  Short term goal 5: verb good understanding of possible equip needs for home, fall prevention, and EC/WS techs for use with ADLs/mob  Patient Goals   Patient goals : to go home       Therapy Time   Individual Concurrent Group Co-treatment   Time In 1430         Time Out 1513         Minutes [place], [place]

## 2017-12-12 NOTE — CARE COORDINATION
Orthopedic Nurse Navigator Day of Surgery Assessment & Planning Note:    Admission Date:   2017 Omar Varela is a 67 y.o.  male    Attending:   Joselito Rodarte    Admitted for:  END STAGE OA LEFT KNEE    Met with:    Patient and Family    PCP:     [address]:    MEDICARE    VTE Prophylaxis: Xarelto 10 mg daily    ABX in 24 hrs? YES    Attended Class? YES    PT day of surg? Yes    BMI =    Body mass index is 29.95 kg/m². Labs:     Recent Labs      17   0634   K  4.1        Height and Weight  Height: 5' 8\" (172.7 cm)  Weight: 197 lb (89.4 kg)  Weight Method: Actual  BSA (Calculated - sq m): 2.07 sq meters  BMI (Calculated): 30     Vital Signs  Temp: 97.3 °F (36.3 °C)  Temp Source: Oral  Pulse: (!) 49  Heart Rate Source: Monitor  Resp: 16  BP: (!) 153/73  BP Location: Right upper arm  BP Upper/Lower: Upper  MAP (mmHg): 87  Patient Position: Supine  Level of Consciousness: Alert  MEWS Score: 1  Patient Currently in Pain: Denies    Living Arrngmnts: independently at home             Current Services? No    OPPT chosen? Yes  OPPT Agency? [facility] [address], [address], [facility] chosen? No  [facility] Agency?   n/a    SNF Needed:  No  SNF Chosen? n/a    Patient provided  Freedom of choice? Yes         DME:     [facility]gar with wheels needed   DME Supplier:  [address] [place] [address] Street:    [place] [id number], [place], [place]       Transportation Provider:  Family                      Discharge Plan:   Patient intends to discharge to home with OPPT   Patient does need a walker with wheels. Patient states pain is at a 0 currently and is well controlled. Patient also states they have experienced no nausea since the surgery. Patient states they live with spouse in a 2-story home, with 2 steps to enter the home.     The anticipated discharge date is 17.     Readmission Risk              Readmission Risk:        1       Age 72 or Greater:  1    Admitted from SNF or Requires Paid or Family Care:      Currently has CHF,COPD,ARF,CRI,or is on dialysis:      Takes more than 5 Prescription Medications:      Takes Digoxin,Insulin,Anticoagulants,Narcotics or ASA/Plavix:      [address] in Past 12 Months:      On Disability:      Patient Considers own Health:            ELECTRONIC SIGNATURE:    12/12/17 at 8:43 AM    LISETTE Rodas, RN, [address] [place] [address]    Phone: [phone] / Fax: [phone]

## 2017-12-12 NOTE — BRIEF OP NOTE
Brief Postoperative Note  ______________________________________________________________    Patient: Nicole Kim  YOB: 1945  MRN: [id number]  Date of Procedure: 12/12/2017    Pre-Op Diagnosis: OA LEFT KNEE    Post-Op Diagnosis: Same       Procedure(s): LEFT TKA    Anesthesia: Spinal w/ MAC    Surgeon(s):  DO Nia Cruz DO PGY5  Merry Chairez DO PGY2    Staff:  Scrub Person First: Sage Latham     Estimated Blood Loss: 200    Cell saver: 125    IVF: 2L crystalloid    UOP: 959OA    Complications: None    Specimens:   * No specimens in log *    Implants:  * No implants in log *      Drains:      Findings: L KNEE OA    Merry Chairez DO  Date: 12/12/2017  Time: 7:11 AM

## 2017-12-12 NOTE — CONSULTS
[facility] / HISTORY AND PHYSICAL EXAMINATION            Date:   12/12/2017  Patient name:  Brittney Campoverde  Date of admission:  12/12/2017  5:48 AM  MRN:   [id number]  Account:  [de-identified]  YOB: 1945  PCP:    Iline Duverney  Room:   2105/2105-01  Code Status:    Full Code    Physician Requesting Consult: Lisbet Bentley DO    Reason for Consult:  Postoperative medical management request of Dr. Nevin Prabhakar    Chief Complaint:     Knee pain, for total knee arthroplasty    History Obtained From:     patient    History of Present Illness: This is a 59-year-old white male who is admitted with complaint of left knee pain for total knee arthroplasty. Has long-standing history of osteoarthritis and has failed conservative measures. He underwent total knee arthroplasty today and were asked to assist in his postoperative medical management of hypertension. He has long-standing history of hypertension which is well-controlled on current medications. He denies any chest pains, palpitations, orthopnea or other cardiac arrest or symptoms. He states he feels well other than his chronic knee pain which feels better postoperatively. Past Medical History:     Past Medical History:   Diagnosis Date    Arthritis     Knees, Hands    Cataract     Left eye    GERD (gastroesophageal reflux disease)     Hypertension     Wrist fracture, left     Left        Past Surgical History:     Past Surgical History:   Procedure Laterality Date    COLONOSCOPY      x4 Polypectomy. Benign    ENDOSCOPY, COLON, DIAGNOSTIC      FOOT SURGERY Right     Great toe fused, screws implant.  JOINT REPLACEMENT      POLYPECTOMY      Colon. Benign per pt.     LA TOTAL KNEE ARTHROPLASTY Left 12/12/2017    LEFT KNEE TOTAL ARTHROPLASTY- MANCIA NEPHEW, PLATELET GEL, AQUAMANTYS performed by Lisbet Bentley DO at [address] Left 12/12/2017     MANCIA NEPHEW, PLATELET GEL, AQUAMANTYS         Medications Prior to Admission:     Prior to Admission medications    Medication Sig Start Date End Date Taking? Authorizing Provider   esomeprazole Magnesium (NEXIUM) 40 MG PACK Take 40 mg by mouth daily   Yes Historical Provider, MD   olmesartan-amLODIPine-HCTZ 40-10-25 MG TABS Take 40 mg by mouth 40/25mg   Yes Historical Provider, MD   fenofibrate micronized (LOFIBRA) 134 MG capsule Take 134 mg by mouth every morning (before breakfast)   Yes Historical Provider, MD   Omega-3 Fatty Acids (FISH OIL BURP-LESS PO) Take by mouth daily   Yes Historical Provider, MD   Cholecalciferol (VITAMIN D PO) Take by mouth Unknown strength   Yes Historical Provider, MD        Allergies:     Percocet [oxycodone-acetaminophen] and Vicodin [hydrocodone-acetaminophen]    Social History:     Tobacco:    reports that he quit smoking about 47 years ago. He has a 5.00 pack-year smoking history. He has never used smokeless tobacco.  Alcohol:      reports that he drinks alcohol. Drug Use:  reports that he does not use drugs. Family History:     Family History   Problem Relation Age of Onset    Other Mother      AAA rupture       Review of Systems:     Positive and Negative as described in HPI. CONSTITUTIONAL:  negative for fevers, chills, sweats, fatigue, weight loss  HEENT:  negative for vision, hearing changes, runny nose, throat pain  RESPIRATORY:  negative for shortness of breath, cough, congestion, wheezing. CARDIOVASCULAR:  negative for chest pain, palpitations.   GASTROINTESTINAL:  negative for nausea, vomiting, diarrhea, constipation, change in bowel habits, abdominal pain   GENITOURINARY:  negative for difficulty of urination, burning with urination, frequency   INTEGUMENT:  negative for rash, skin lesions, easy bruising   HEMATOLOGIC/LYMPHATIC:  negative for swelling/edema   ALLERGIC/IMMUNOLOGIC:  negative for urticaria , itching  ENDOCRINE:  negative increase in drinking, increase in urination, hot or cold intolerance  MUSCULOSKELETAL:  Positive for joint pains, negative for muscle aches, swelling of joints  NEUROLOGICAL:  negative for headaches, dizziness, lightheadedness, numbness, pain, tingling extremities  BEHAVIOR/PSYCH:  negative for depression, anxiety    Physical Exam:     /64   Pulse 51   Temp 97.4 °F (36.3 °C) (Oral)   Resp 16   Ht 5' 8\" (1.727 m)   Wt 197 lb (89.4 kg)   SpO2 99%   BMI 29.95 kg/m²   Temp (24hrs), Av °F (36.1 °C), Min:96.8 °F (36 °C), Max:97.4 °F (36.3 °C)    No results for input(s): POCGLU in the last 72 hours. Intake/Output Summary (Last 24 hours) at 17 1736  Last data filed at 17 1635   Gross per 24 hour   Intake             3305 ml   Output             1050 ml   Net             2255 ml       General Appearance:  alert, well appearing, and in no acute distress  Mental status: oriented to person, place, and time with normal affect  Head:  normocephalic, atraumatic. Eye: no icterus, redness, pupils equal and reactive, extraocular eye movements intact, conjunctiva clear  Ear: normal external ear, no discharge, hearing intact  Nose:  no drainage noted  Mouth: mucous membranes moist  Neck: supple, no carotid bruits, thyroid not palpable  Lungs: Bilateral equal air entry, clear to ausculation, no wheezing, rales or rhonchi, normal effort  Cardiovascular: normal rate, regular rhythm, no murmur, gallop, rub.   Abdomen: Soft, nontender, nondistended, normal bowel sounds, no hepatomegaly or splenomegaly  Neurologic: There are no new focal motor or sensory deficits, normal muscle tone and bulk, no abnormal sensation, normal speech, cranial nerves II through XII grossly intact  Skin: No gross lesions, rashes, bruising or bleeding on exposed skin area  Extremities:  peripheral pulses palpable, no pedal edema or calf pain with palpation  Psych: normal affect     Osteopathic Examination: Negative in 2 positions    Investigations:      Laboratory Testing:  Recent Results (from the past 24 hour(s))   K (Potassium)    Collection Time: 12/12/17  6:34 AM   Result Value Ref Range    Potassium 4.1 3.7 - 5.3 mmol/L       Imaging/Diagonstics:  Postoperative x-ray shows arthroplasty to be in good position    Assessment :      Primary Problem  S/P total knee arthroplasty, left    Active Hospital Problems    Diagnosis Date Noted    S/P total knee arthroplasty, left [Z96.652] 12/12/2017    Hypertension [I10]     GERD (gastroesophageal reflux disease) [K21.9]        Plan:     1. Admit inpatient per orthopedics  2. Continue home medications  3. Monitor and control blood pressure  4. Continue Xarelto for DVT prophylaxis  5. PT and OT per postoperative protocol  6. Add low-dose metoprolol as needed for systolic pressure greater than 60  7. See orders for details  8.  Thank you for consultation we will follow with you    Consultations:   David Sexton 3, DO  12/12/2017  5:36 PM    Copy sent to Dr. Maribell Vidal

## 2017-12-12 NOTE — INTERVAL H&P NOTE
History and Physical Update    Pt Name: Hero Abraham  MRN: [id number]  YOB: 1945  Date of evaluation: 12/12/2017      [x] I have reviewed the H&P done in [facility] dated 11/29/17 by Krista Watt CNP which meets the criteria for an Interval History and Physical note and is attached below. [x] I have examined  Hero Abraham  There are no changes to the plans for a Left total knee arthroplasty by Dr Kemp Litten for left knee OA. The patient denies health changes, fever, chills, productive cough, SOB, open sores or wounds. Vital signs: /72   Pulse 52   Temp 97.3 °F (36.3 °C) (Oral)   Resp 16   SpO2 98%     Allergies:  Percocet [oxycodone-acetaminophen] and Vicodin [hydrocodone-acetaminophen]    Medications:   No current facility-administered medications on file prior to encounter. No current outpatient prescriptions on file prior to encounter. Prior to Admission medications    Medication Sig Start Date End Date Taking? Authorizing Provider   esomeprazole Magnesium (NEXIUM) 40 MG PACK Take 40 mg by mouth daily    Historical Provider, MD   olmesartan-amLODIPine-HCTZ 40-10-25 MG TABS Take 40 mg by mouth 40/25mg    Historical Provider, MD   fenofibrate micronized (LOFIBRA) 134 MG capsule Take 134 mg by mouth every morning (before breakfast)    Historical Provider, MD   Omega-3 Fatty Acids (FISH OIL BURP-LESS PO) Take by mouth daily    Historical Provider, MD   Cholecalciferol (VITAMIN D PO) Take by mouth Unknown strength    Historical Provider, MD       This is a 67 y.o. male who is pleasant, cooperative, alert and oriented x3, in no acute distress. Heart: Heart sounds are normal.  HR 52 Bradycardic rate and regular rhythm without symptoms  No murmur, gallop or rub.    Lungs: Normal effort,unlabored respirations and clear to auscultation without wheezes or rales    Abdomen: soft, nontender, nondistended with bowel sounds noted      Labs:  Recent Labs      11/29/17   0915   HGB  17.3   HCT  51.3 WBC  6.6   MCV  88.1   PLT  186   NA  138   K  3.3*   CL  102   CO2  23   BUN  20   CREATININE  1.00   GLUCOSE  139*   INR  1.0   PROTIME  10.6   APTT  25.9       Maria Ramirez APRN, ANP-BC  Electronically signed 12/12/2017 at 6:22 AM

## 2017-12-12 NOTE — ANESTHESIA PRE PROCEDURE
 methylene blue 1 % injection    PRN Sae Gola, DO   1 mL at 12/12/17 0841    povidone-iodine (BETADINE) 10 % 30 mL in sodium chloride 0.9 % 1,000 mL irrigation    Continuous PRN Sae Gola, DO   500 mL at 12/12/17 7357     Facility-Administered Medications Ordered in Other Encounters   Medication Dose Route Frequency Provider Last Rate Last Dose    tranexamic acid (CYKLOKAPRON) injection    PRN Emerald Matte, DO   1,000 mg at 12/12/17 0756    propofol injection    PRN Emerald Matte, DO   50 mg at 12/12/17 0744    propofol injection    Continuous PRN Emerald Matte, DO 40.2 mL/hr at 12/12/17 0744 75 mcg/kg/min at 12/12/17 0744    lidocaine PF 2 % injection    PRN Emerald Matte, DO   50 mg at 12/12/17 0744       Allergies: Allergies   Allergen Reactions    Percocet [Oxycodone-Acetaminophen] Other (See Comments)     Does not like feeling in head. Cloudy feeling.  Vicodin [Hydrocodone-Acetaminophen] Other (See Comments)     Cloudiness, does not like feeling. Problem List:  There is no problem list on file for this patient. Past Medical History:        Diagnosis Date    Arthritis     Knees, Hands    Cataract     Left eye    GERD (gastroesophageal reflux disease)     Hypertension     Wrist fracture, left     Left       Past Surgical History:        Procedure Laterality Date    COLONOSCOPY      x4 Polypectomy. Benign    ENDOSCOPY, COLON, DIAGNOSTIC      FOOT SURGERY Right     Great toe fused, screws implant.  POLYPECTOMY      Colon. Benign per pt.        Social History:    Social History   Substance Use Topics    Smoking status: Former Smoker     Packs/day: 1.00     Years: 5.00    Smokeless tobacco: Never Used    Alcohol use Yes      Comment: occasional                                Counseling given: Not Answered      Vital Signs (Current):   Vitals:    12/12/17 0654 12/12/17 0657 12/12/17 0700 12/12/17 0715   BP: (!) 149/79 131/73 (!) 146/74 (!) 153/73 tolerance: no interval change,   (+) hypertension:,     (-) pacemaker, past MI and CAD    ECG reviewed  Rhythm: regular  Rate: normal           Beta Blocker:  Not on Beta Blocker         Neuro/Psych:      (-) seizures, TIA and CVA           GI/Hepatic/Renal:   (+) GERD: well controlled,           Endo/Other:    (+) : arthritis: OA., .    (-) no Type II DM               Abdominal:           Vascular:                                        Anesthesia Plan      spinal     ASA 3       Induction: intravenous. Anesthetic plan and risks discussed with patient.         Attending anesthesiologist reviewed and agrees with Pre Eval content              Santos Olivier DO   12/12/2017

## 2017-12-12 NOTE — FLOWSHEET NOTE
12/12/17 1432   Provider Notification   Reason for Communication Review case   Provider Name Dr. Nevin Prabhakar   Provider Notification Physician   Method of Communication Call   Response See orders   Notification Time [phone]     Clarified medications with Dr. Nevin Prabhakar. See orders.

## 2017-12-12 NOTE — OP NOTE
[address] [facility] [address]                 [address] [facility], [address] [place] [address]                                 OPERATIVE REPORT    PATIENT NAME: Ira Perry                     :        1945  MED REC NO:   [id number]                             ROOM:       2105  ACCOUNT NO:   [de-identified]                           ADMIT DATE: 2017  PROVIDER:     Keyonna Jones    DATE OF PROCEDURE:  2017    PREOPERATIVE DIAGNOSIS:  Left knee osteoarthritis. POSTOPERATIVE DIAGNOSIS:  Left knee osteoarthritis. OPERATION PERFORMED:  Left total knee arthroplasty with [facility]  VISIONAIRE patient specific cutting guides, size 6 left Journey II, Oxinium  posterior stabilized femur, size 6 left Journey non-porous tibial base  plate, size 35 mm three peg oval dome patella, 10 mm posterior stabilized  polyethylene. SURGEON:  Cheng Alves. Hue Shoemaker DO    ASSISTANTS:  Shirlene Cavanaugh DO, PGY-5 and Sher Tao DO, PGY-2. ANESTHESIA:  Spinal.    FLUIDS:  1800 mL of crystalloid with 125 mL of cell saver. BLOOD LOSS:  200 mL. COMPLICATIONS:  No known. DISPOSITION:  To postanesthesia care unit in stable condition. FINDINGS:  Severe osteoarthritis, left knee. INDICATIONS:  The patient is a 59-year-old male with longstanding knee pain  failing conservative therapy. He understands the risks and the benefits of  the procedure. Informed consent was signed. Operative site was marked. Preoperative antibiotics were given. OPERATIVE PROCEDURE:  The patient was taken to the operative suite and  placed in supine position on the operating table. Spinal anesthetic was  performed. Vargas catheter was placed. A well-padded tourniquet was placed  on his left proximal thigh. Of note, there was a 10-degree flexion  contracture and a 10-degree varus alignment. The patient was prepped and  draped in normal sterile fashion. Standard total joint precautions were  carried out.   A

## 2017-12-12 NOTE — CARE COORDINATION
[facility] Bundle Transition Interview    Patient Interviewed: Chris Willams  Informed patient of [facility] Ortho Bundle Program and role of CTS/CTC. CMS Letter Given:  Yes    Living Situation:   Living arrangements: with family:  spouse                   Home: 2-story house and bed/bath on second floor. Social support:a good social support network    Risk Analysis:  Has the following:    Readmission Risk              Readmission Risk:        2.25       Age 72 or Greater:  1    Admitted from SNF or Requires Paid or Family Care:  0    Currently has CHF,COPD,ARF,CRI,or is on dialysis:  0    Takes more than 5 Prescription Medications:  0    Takes Digoxin,Insulin,Anticoagulants,Narcotics or ASA/Plavix:  [address] in Past 12 Months:  0    On Disability:  0    Patient Considers own Health:  1.25         Chronic Illness:   Past Medical History:   Diagnosis Date    Arthritis     Knees, Hands    Cataract     Left eye    GERD (gastroesophageal reflux disease)     Hypertension     Wrist fracture, left     Left     Fall Risk & DME:   Any previous falls or injuries in the home? No   Visual Impairment:  Glasses   Difficulty hearing: [place] (hard of hearing, Deaf, Wears hearing aids)   Able to express needs/desires? Yes   Home Equipment:     Financial Assessment   Afford medications? Yes   Connected with the following services? none    Mode of transportation? Active     Health Literacy Assessment   Does anyone help with medications at home? Yes  Anything preventing from taking medications at home? No    Anticipated Needs Post Evaluation:  Met with patient/spouse at bedside for discharge planning and [address]. Patient currently plans to discharge home with family assist as needed and outpatient physical therapy. Patient stated he plans to go to OP PT at [facility]. Chris Willams currently does not have a walker and will need one prior to discharge.  Discussed [facility] Bundle

## 2017-12-12 NOTE — H&P (VIEW-ONLY)
rhonchi or wheeze. Nonlabored. Abdomen:  Soft, nontender. No flank pain with percussion. Lymphatics:  No palpable cervical or supraclavicular lymphadenopathy. Extremities:  Radial pulses 2+. Pedal pulses 2+. No edema. No calf tenderness. Negative lillians sign. Locomotor/ Back/Spine:  No para spinus tenderness. 5/5 strength upper and lower extremities. Neurological/Behavioral  Alert and oriented. Able to move all extremities. No facial droop. No slurred speech. Cranial nerves 2-12  grossly intact.                                 Shay King CNP on 11/29/2017 at 9:47 AM

## 2017-12-12 NOTE — PROGRESS NOTES
Patient admitted to 2105. Patient alert and oriented. Wife at bedside. Falling star precautions in place.

## 2017-12-13 PROBLEM — R31.0 GROSS HEMATURIA: Status: ACTIVE | Noted: 2017-12-13

## 2017-12-13 LAB
-: ABNORMAL
ABSOLUTE EOS #: 0.1 K/UL (ref 0–0.4)
ABSOLUTE IMMATURE GRANULOCYTE: ABNORMAL K/UL (ref 0–0.3)
ABSOLUTE LYMPH #: 0.7 K/UL (ref 1–4.8)
ABSOLUTE MONO #: 0.9 K/UL (ref 0.2–0.8)
AMORPHOUS: ABNORMAL
ANION GAP SERPL CALCULATED.3IONS-SCNC: 11 MMOL/L (ref 9–17)
BACTERIA: ABNORMAL
BASOPHILS # BLD: 0 % (ref 0–2)
BASOPHILS ABSOLUTE: 0 K/UL (ref 0–0.2)
BILIRUBIN URINE: NEGATIVE
BUN BLDV-MCNC: 16 MG/DL (ref 8–23)
BUN/CREAT BLD: 16 (ref 9–20)
CALCIUM SERPL-MCNC: 8.5 MG/DL (ref 8.6–10.4)
CASTS UA: ABNORMAL /LPF
CHLORIDE BLD-SCNC: 103 MMOL/L (ref 98–107)
CO2: 23 MMOL/L (ref 20–31)
COLOR: YELLOW
COMMENT UA: ABNORMAL
CREAT SERPL-MCNC: 0.99 MG/DL (ref 0.7–1.2)
CRYSTALS, UA: ABNORMAL /HPF
DIFFERENTIAL TYPE: ABNORMAL
EOSINOPHILS RELATIVE PERCENT: 1 % (ref 1–4)
EPITHELIAL CELLS UA: ABNORMAL /HPF (ref 0–5)
GFR AFRICAN AMERICAN: >60 ML/MIN
GFR NON-AFRICAN AMERICAN: >60 ML/MIN
GFR SERPL CREATININE-BSD FRML MDRD: ABNORMAL ML/MIN/{1.73_M2}
GFR SERPL CREATININE-BSD FRML MDRD: ABNORMAL ML/MIN/{1.73_M2}
GLUCOSE BLD-MCNC: 120 MG/DL (ref 70–99)
GLUCOSE URINE: NEGATIVE
HCT VFR BLD CALC: 45.8 % (ref 41–53)
HEMOGLOBIN: 15.4 G/DL (ref 13.5–17.5)
IMMATURE GRANULOCYTES: ABNORMAL %
KETONES, URINE: NEGATIVE
LEUKOCYTE ESTERASE, URINE: NEGATIVE
LYMPHOCYTES # BLD: 8 % (ref 24–44)
MCH RBC QN AUTO: 29.1 PG (ref 26–34)
MCHC RBC AUTO-ENTMCNC: 33.7 G/DL (ref 31–37)
MCV RBC AUTO: 86.5 FL (ref 80–100)
MONOCYTES # BLD: 11 % (ref 1–7)
MUCUS: ABNORMAL
NITRITE, URINE: NEGATIVE
OTHER OBSERVATIONS UA: ABNORMAL
PDW BLD-RTO: 12.7 % (ref 11.5–14.5)
PH UA: 5.5 (ref 5–8)
PLATELET # BLD: 146 K/UL (ref 130–400)
PLATELET ESTIMATE: ABNORMAL
PMV BLD AUTO: ABNORMAL FL (ref 6–12)
POTASSIUM SERPL-SCNC: 3.7 MMOL/L (ref 3.7–5.3)
PROTEIN UA: ABNORMAL
RBC # BLD: 5.29 M/UL (ref 4.5–5.9)
RBC # BLD: ABNORMAL 10*6/UL
RBC UA: ABNORMAL /HPF (ref 0–2)
RENAL EPITHELIAL, UA: ABNORMAL /HPF
SEG NEUTROPHILS: 80 % (ref 36–66)
SEGMENTED NEUTROPHILS ABSOLUTE COUNT: 6.9 K/UL (ref 1.8–7.7)
SODIUM BLD-SCNC: 137 MMOL/L (ref 135–144)
SPECIFIC GRAVITY UA: 1.02 (ref 1–1.03)
TRICHOMONAS: ABNORMAL
TURBIDITY: CLEAR
URINE HGB: ABNORMAL
UROBILINOGEN, URINE: NORMAL
WBC # BLD: 8.6 K/UL (ref 3.5–11)
WBC # BLD: ABNORMAL 10*3/UL
WBC UA: ABNORMAL /HPF (ref 0–5)
YEAST: ABNORMAL

## 2017-12-13 PROCEDURE — 1200000000 HC SEMI PRIVATE

## 2017-12-13 PROCEDURE — 97530 THERAPEUTIC ACTIVITIES: CPT

## 2017-12-13 PROCEDURE — 6360000002 HC RX W HCPCS: Performed by: ORTHOPAEDIC SURGERY

## 2017-12-13 PROCEDURE — 36415 COLL VENOUS BLD VENIPUNCTURE: CPT

## 2017-12-13 PROCEDURE — 6370000000 HC RX 637 (ALT 250 FOR IP): Performed by: INTERNAL MEDICINE

## 2017-12-13 PROCEDURE — 81001 URINALYSIS AUTO W/SCOPE: CPT

## 2017-12-13 PROCEDURE — 97116 GAIT TRAINING THERAPY: CPT

## 2017-12-13 PROCEDURE — 97535 SELF CARE MNGMENT TRAINING: CPT | Performed by: NURSE PRACTITIONER

## 2017-12-13 PROCEDURE — 6360000002 HC RX W HCPCS: Performed by: STUDENT IN AN ORGANIZED HEALTH CARE EDUCATION/TRAINING PROGRAM

## 2017-12-13 PROCEDURE — 99232 SBSQ HOSP IP/OBS MODERATE 35: CPT | Performed by: INTERNAL MEDICINE

## 2017-12-13 PROCEDURE — 80048 BASIC METABOLIC PNL TOTAL CA: CPT

## 2017-12-13 PROCEDURE — 6370000000 HC RX 637 (ALT 250 FOR IP): Performed by: STUDENT IN AN ORGANIZED HEALTH CARE EDUCATION/TRAINING PROGRAM

## 2017-12-13 PROCEDURE — 85025 COMPLETE CBC W/AUTO DIFF WBC: CPT

## 2017-12-13 PROCEDURE — 97110 THERAPEUTIC EXERCISES: CPT

## 2017-12-13 PROCEDURE — 97530 THERAPEUTIC ACTIVITIES: CPT | Performed by: NURSE PRACTITIONER

## 2017-12-13 PROCEDURE — 6370000000 HC RX 637 (ALT 250 FOR IP): Performed by: ORTHOPAEDIC SURGERY

## 2017-12-13 PROCEDURE — 2580000003 HC RX 258: Performed by: STUDENT IN AN ORGANIZED HEALTH CARE EDUCATION/TRAINING PROGRAM

## 2017-12-13 RX ADMIN — AMLODIPINE BESYLATE 10 MG: 10 TABLET ORAL at 09:41

## 2017-12-13 RX ADMIN — CEFAZOLIN SODIUM 2 G: 2 SOLUTION INTRAVENOUS at 00:10

## 2017-12-13 RX ADMIN — DOCUSATE SODIUM 100 MG: 100 CAPSULE, LIQUID FILLED ORAL at 09:42

## 2017-12-13 RX ADMIN — HYDROCHLOROTHIAZIDE 25 MG: 25 TABLET ORAL at 09:42

## 2017-12-13 RX ADMIN — FENOFIBRATE 160 MG: 160 TABLET ORAL at 09:42

## 2017-12-13 RX ADMIN — GABAPENTIN 100 MG: 100 CAPSULE ORAL at 21:50

## 2017-12-13 RX ADMIN — FENTANYL CITRATE 50 MCG: 50 INJECTION INTRAMUSCULAR; INTRAVENOUS at 05:29

## 2017-12-13 RX ADMIN — LOSARTAN POTASSIUM 100 MG: 100 TABLET, FILM COATED ORAL at 09:42

## 2017-12-13 RX ADMIN — GABAPENTIN 100 MG: 100 CAPSULE ORAL at 13:53

## 2017-12-13 RX ADMIN — TAPENTADOL HYDROCHLORIDE 100 MG: 50 TABLET, FILM COATED ORAL at 02:06

## 2017-12-13 RX ADMIN — TAPENTADOL HYDROCHLORIDE 100 MG: 100 TABLET, FILM COATED ORAL at 14:12

## 2017-12-13 RX ADMIN — CELECOXIB 200 MG: 200 CAPSULE ORAL at 09:42

## 2017-12-13 RX ADMIN — ACETAMINOPHEN 1000 MG: 500 TABLET ORAL at 21:50

## 2017-12-13 RX ADMIN — ACETAMINOPHEN 1000 MG: 500 TABLET ORAL at 02:07

## 2017-12-13 RX ADMIN — SODIUM CHLORIDE: 9 INJECTION, SOLUTION INTRAVENOUS at 14:53

## 2017-12-13 RX ADMIN — Medication 10 ML: at 09:43

## 2017-12-13 RX ADMIN — DOCUSATE SODIUM 100 MG: 100 CAPSULE, LIQUID FILLED ORAL at 21:50

## 2017-12-13 RX ADMIN — GABAPENTIN 100 MG: 100 CAPSULE ORAL at 09:41

## 2017-12-13 RX ADMIN — RIVAROXABAN 10 MG: 10 TABLET, FILM COATED ORAL at 18:18

## 2017-12-13 RX ADMIN — ACETAMINOPHEN 1000 MG: 500 TABLET ORAL at 13:53

## 2017-12-13 RX ADMIN — CELECOXIB 200 MG: 200 CAPSULE ORAL at 21:50

## 2017-12-13 ASSESSMENT — PAIN DESCRIPTION - FREQUENCY
FREQUENCY: INTERMITTENT

## 2017-12-13 ASSESSMENT — PAIN DESCRIPTION - DESCRIPTORS
DESCRIPTORS: SORE
DESCRIPTORS: ACHING
DESCRIPTORS: SORE

## 2017-12-13 ASSESSMENT — PAIN DESCRIPTION - LOCATION
LOCATION: KNEE

## 2017-12-13 ASSESSMENT — PAIN SCALES - GENERAL
PAINLEVEL_OUTOF10: 2
PAINLEVEL_OUTOF10: 2
PAINLEVEL_OUTOF10: 8
PAINLEVEL_OUTOF10: 6
PAINLEVEL_OUTOF10: 4
PAINLEVEL_OUTOF10: 7
PAINLEVEL_OUTOF10: 2
PAINLEVEL_OUTOF10: 8

## 2017-12-13 ASSESSMENT — PAIN DESCRIPTION - ORIENTATION
ORIENTATION: LEFT

## 2017-12-13 ASSESSMENT — PAIN DESCRIPTION - ONSET
ONSET: ON-GOING

## 2017-12-13 ASSESSMENT — PAIN DESCRIPTION - PROGRESSION
CLINICAL_PROGRESSION: GRADUALLY WORSENING
CLINICAL_PROGRESSION: GRADUALLY WORSENING

## 2017-12-13 ASSESSMENT — PAIN DESCRIPTION - PAIN TYPE
TYPE: SURGICAL PAIN

## 2017-12-13 NOTE — PROGRESS NOTES
Goals    Patient goals  to go home   Short term goals   Time Frame for Short term goals by discharge, pt will   Short term goal 1 demo S/MI with ADL transfers with good safety   Short term goal 2 demo S/MI with functional mob for ADL completion with good safety   Short term goal 3 demo S/MI with toileting routine   Short term goal 4 demo I with UB ADLs and min A with LB ADLs with good pacing and safety   Short term goal 5 verb good understanding of possible equip needs for home, fall prevention, and EC/WS techs for use with ADLs/mob   Assessment   Performance deficits / Impairments Decreased functional mobility ; Decreased ADL status; Decreased strength;Decreased safe awareness;Decreased endurance;Decreased balance;Decreased sensation   Prognosis Good   Patient Education OT POC, safety during transfers, LBD tech   REQUIRES OT FOLLOW UP Yes   Discharge Recommendations Home with assist PRN;Home with Home health OT   Activity Tolerance   Activity Tolerance Patient Tolerated treatment well   Safety Devices   Safety Devices in place Yes   Type of devices Nurse notified; Left in chair;Patient at risk for falls;Gait belt;Call light within reach; All fall risk precautions in place   Restraints   Initially in place No   Other Comments   Comments [facility]:[id number]   Plan   Times per week 5x/week, 1-2x/day   Current Treatment Recommendations Strengthening;Balance Training;Functional Mobility Training; Endurance Training;Self-Care / ADL; Safety Education & Training;Patient/Caregiver Education & Training;Equipment Evaluation, Education, & procurement   Upon arrival pt supine in bed. Supine>sit>stand, demo mobility to bathroom to complete grooming standing at sink. Demo transfer from RW to shower seat with good carryover of directions. Demo mobility to chair. Upon writer exit, call light within reach, pt retired to chair. All lines intact and patient positioned comfortably. All patient needs addressed prior to ending therapy session. Chart reviewed prior to treatment and patient is agreeable for therapy. RN reports patient is medically stable for therapy treatment this date. Pt would benefit from skilled home OT services in order to maximize occupational performance, safety, and independence in the home environment.    Linwood Holter COTA/L

## 2017-12-13 NOTE — CARE COORDINATION
your activity level, improve your eating habits. If you are a smoker, seriously consider stopping this habit all together. It will slow your healing process and decrease your ability to do your physical activity. The same principle applies if you drink alcohol in excess. This also slows your ability to heal.       · Ice and elevation  Ice and elevate your leg AT LEAST 4 times a day for 20 minutes each time! This will reduce swelling which will in turn reduce pain. · Compression  Compression Socks go on your legs before you get out of bed in the morning and should be removed just before you go to bed at night for the first four weeks after discharge. · Activity  During the day, get up at least once every hour. Go get a drink of water, go to the bathroom, go look out the window - movement is essential and key to recovery!!!    · Physical Therapy  Do your physical therapy exercises twice a day - with a therapist, a , or on your own. Movement is key!!!    · Rest:  Balance is key - in other words, do not sit for too long, but also do not overdo your activity level either. Sitting for too long will cause stiffness, but overdoing activity may cause you to become tired and weak and increases your chances of falling, as well as increase your swelling and pain. Again, BALANCE is the key! · Avoid Constipation  Narcotic pain medications slow bowel motility and can cause constipation. To prevent you from becoming constipated, be sure to drink lots of fluids, eat fiber-rich foods, take a daily stool softener and take a laxative, as needed. · You Really Are What You Eat: Wound Healing:  Make sure to eat lots of protein, vitamin C and iron-rich foods to help your body with the healing process.   (If you have other medical conditions that require you to limit your protein, then you must find the right balance of protein to meet your healing needs balanced with your other medical conditions.)     · Avoid relieve pain after surgery. The pump is connected to a small catheter (tube) which is inserted your leg by the physician. The pump continuously delivers medication that blocks pain in the area of the procedure. The pump provides you the ability to give yourself an extra dose of pain relief once every half-hour. When you press the \"Dose\" button on the pump it releases an extra 3 ml of numbing medication.       Removal of Catheter (tube): If instructed to remove the catheter:  1. Wash and dry hands thoroughly. 2. Remove the dressing covering the insertion site. 3. Remove any adhesive strips. 4. Grasp the catheter close to the skin and gently pull back on the catheter. 5. It should be easy and not painful. Do not tug or quickly pull on the catheter. If it becomes hard to remove or stretches, then STOP. Call your doctor. Continued pulling could break the catheter. DO NOT cut or pull hard to remove the catheter.     While using the pump, if any of the following symptoms present themselves, immediately close the clamp on the pump tubing and call the doctor or 911 in case of an emergency.     · Increase in pain  · Fever, chills, sweats  · Bowel or bladder changes  · Difficulty breathing  · Redness, warmth, discharge or excessive bleeding from the catheter site  · Pain, swelling or a large bruise around the catheter site  · Dizziness, light headedness · Blurred vision  · Ringing, buzzing in your ears  · Metal taste in your mouth  · Numbness and/or tingling around your mouth, finger or toes  · Drowsiness  · Confusion   For other questions please call the 24 hour Support Line at [phone]. Support is available 24 hours a day, 7 days a week. You may also go to [employer] Conject/BLOCK for additional patient resources regarding the [employer].      Foods to help prevent constipation while on narcotic pain medications:    FRUITS AND VEGETABLES HIGH IN FIBER:    Avocado  10 grams of fiber in 1 cup    Raspberries  8

## 2017-12-13 NOTE — PROGRESS NOTES
exercises. No ROM taken d/t ace wrap still applied and RN would like it to remain until doc approval to remove. Patient Goals    Patient goals  full use of LE   Short term goals   Time Frame for Short term goals 12 visits:   Short term goal 1 Pt. to be indep with bed mob. Short term goal 2 Pt. to be indep with sit to stand transfer with RW   Short term goal 3 Pt. to amb. 200 ft. with RW, SBA   Short term goal 4 Pt. to tolerate 25+ min., BID, for TKA ex. / gait/ functional mobility training   Short term goal 5 Pt. to safely negotiate full flight steps to prepare for d/c home and upstairs bedroom   Conditions Requiring Skilled Therapeutic Intervention   Body structures, Functions, Activity limitations Decreased functional mobility ; Decreased ADL status; Decreased ROM; Decreased strength;Decreased endurance;Decreased balance   Assessment Patient approrpaite for Home d/c with HHPT . Prognosis Excellent   Patient Education Elevation, TKE, pillow placement, ROM to increase blood flow. REQUIRES PT FOLLOW UP Yes   Discharge Recommendations Home with assist PRN;Home with Home health PT   Activity Tolerance   Activity Tolerance Patient Tolerated treatment well   Plan   Times per week twice daily/ 5-6days/wk   Current Treatment Recommendations Strengthening;ROM;Balance Training;Functional Mobility Training;Transfer Training; Endurance Training;Gait Training;Stair training;Home Exercise Program;Safety Education & Training   Safety Devices   Type of devices All fall risk precautions in place;Call light within reach; Patient at risk for falls; Left in bed;Nurse notified;Gait belt     Ayana Oreilly PTA  Time: 5120-2172

## 2017-12-13 NOTE — PROGRESS NOTES
[facility]    Progress Note    12/13/2017    4:47 PM    Name:   Chuckie Arriaza  MRN:     [id number]     [place]:      [de-identified]   Room:   [address] [place] [address] Day:  1  Admit Date:  12/12/2017  5:48 AM    PCP:   Marietta Habermann  Code Status:  Full Code    Subjective:     C/C: Knee pain, admitted for arthroplasty    Interval History Status: improved. Patient doing well, pain is controlled and he denies any acute complaints. No chest pain, shortness of breath, nausea or vomiting. Has developed hematuria after Vargas removal.    Brief History: This is a 70-year-old white male was admitted to [facility] after undergoing a left total knee arthroplasty for severe degenerative arthritis. We are following postoperative for medical management of hypertension. On December 12, 2017 he developed hematuria after Vargas removal.    Review of Systems:     Constitutional:  negative for chills, fevers, sweats  Respiratory:  negative for cough, dyspnea on exertion, shortness of breath, wheezing  Cardiovascular:  negative for chest pain, chest pressure/discomfort, lower extremity edema, palpitations  Gastrointestinal:  negative for abdominal pain, constipation, diarrhea, nausea, vomiting  Neurological:  negative for dizziness, headache    Medications: Allergies: Allergies   Allergen Reactions    Percocet [Oxycodone-Acetaminophen] Other (See Comments)     Does not like feeling in head. Cloudy feeling.  Vicodin [Hydrocodone-Acetaminophen] Other (See Comments)     Cloudiness, does not like feeling.        Current Meds:   Scheduled Meds:    sodium chloride flush  10 mL Intravenous 2 times per day    acetaminophen  1,000 mg Oral Q6H    docusate sodium  100 mg Oral BID    rivaroxaban  10 mg Oral Daily    gabapentin  100 mg Oral TID    celecoxib  200 mg Oral BID    fenofibrate  160 mg Oral Daily    losartan  100 mg Oral Daily    And    BILIDIR, AMMONIA, AMYLASE, LIPASE, LACTATE, CHOL, HDL, LDLCHOLESTEROL, CHOLHDLRATIO, TRIG, VLDL, VWW61DL, PHENYTOIN, PHENYF, URICACID, POCGLU in the last 72 hours. Lab Results   Component Value Date/Time    SPECIAL NOT REPORTED 11/29/2017 10:44 AM     Lab Results   Component Value Date/Time    CULTURE NO SIGNIFICANT GROWTH 11/29/2017 10:44 AM    CULTURE  11/29/2017 10:44 AM     Performed at [address] [place] [address] [phone]       No results found for: POCPH, PHART, PH, POCPCO2, XLW9DDJ, PCO2, POCPO2, PO2ART, PO2, POCHCO3, NBR6DBS, HCO3, NBEA, PBEA, BEART, BE, THGBART, THB, YMC0WCZ, LHNG7QND, B5MPOKGH, O2SAT, FIO2    Radiology:    No new radiology reports    Physical Examination:        General appearance:  alert, cooperative and no distress  Mental Status:  oriented to person, place and time and normal affect  Lungs:  clear to auscultation bilaterally, normal effort  Heart:  regular rate and rhythm, no murmur  Abdomen:  soft, nontender, nondistended, normal bowel sounds, no masses, hepatomegaly, splenomegaly  Extremities:  no edema, redness, tenderness in the calves, Surgical dressing in place left knee  Skin:  no gross lesions, rashes, induration    Assessment:        Primary Problem  S/P total knee arthroplasty, left    Active Hospital Problems    Diagnosis Date Noted    Gross hematuria [R31.0] 12/13/2017    S/P total knee arthroplasty, left [Z96.652] 12/12/2017    Essential hypertension [I10]     Gastroesophageal reflux disease [K21.9]        Plan:        1. Continue present medications  2. Check UA  3. GI and DVT prophylaxis  4. Hold Xarelto if bleeding does not resolve  5.  Monitor and control blood pressure, no change in current medication    Maximilian Aguilar DO  12/13/2017  4:47 PM

## 2017-12-13 NOTE — PROGRESS NOTES
Physical Therapy  [facility]   [id number]   Discharge Recommendation: Home with assist PRN, Home with Home health PT       12/13/17 1440   Restrictions/Precautions   Restrictions/Precautions General Precautions; Fall Risk   Position Activity Restriction   Other position/activity restrictions LT TKA/BID; [place]   General   Chart Reviewed Yes   Response To Previous Treatment Patient with no complaints from previous session. Family / Caregiver Present Yes  (Wife)   Subjective   Subjective Patient is agreeable for PT treatment. General Comment   Comments RN, José Luis Newell reports patient is medically stable for PT treatment. Pain Screening   Patient Currently in Pain Yes   Pain Assessment   Pain Assessment 0-10   Pain Level 6   Pain Type Surgical pain   Pain Location Knee   Pain Orientation Left   Oxygen Therapy   O2 Device None (Room air)   Bed Mobility   Bridging Stand by assistance   Rolling Stand by assistance   Supine to Sit Stand by assistance   Sit to Supine Stand by assistance   Scooting Stand by assistance   Transfers   Sit to Stand Contact guard assistance   Stand to sit Contact guard assistance   Stand Pivot Transfers Contact guard assistance   Lateral Transfers Contact guard assistance   Ambulation   Ambulation? Yes   Ambulation 1   Surface level tile   Device Rolling Walker   Assistance Contact guard assistance   Quality of Gait step through gait pattern, improved extension of Lt knee, still lacking some extension. Distance 100' x 2   Stairs/Curb   Stairs? Yes   Stairs   # Steps  5   Stairs Height 6\"   Rails Bilateral   Device No Device   Assistance Contact guard assistance   Comment Patient amb to perfrom stair training with good understanding post verbal and physical demonstration. Balance   Posture Good   Sitting - Static Good   Sitting - Dynamic Good   Standing - Static Good  (w/ RW)   Standing - Dynamic Good;-  (w/ RW)   Exercises   Comments Seated troy LE AROM x 15 reps with rest breaks a needed. patient c/o quad pain with exercises. Patient Goals    Patient goals  full use of LE   Short term goals   Time Frame for Short term goals 12 visits:   Short term goal 1 Pt. to be indep with bed mob. Short term goal 2 Pt. to be indep with sit to stand transfer with RW   Short term goal 3 Pt. to amb. 200 ft. with RW, SBA   Short term goal 4 Pt. to tolerate 25+ min., BID, for TKA ex. / gait/ functional mobility training   Short term goal 5 Pt. to safely negotiate full flight steps to prepare for d/c home and upstairs bedroom   Conditions Requiring Skilled Therapeutic Intervention   Body structures, Functions, Activity limitations Decreased functional mobility ; Decreased ADL status; Decreased ROM; Decreased strength;Decreased endurance;Decreased balance   Assessment Patient approrpaite for Home d/c with HHPT . Prognosis Excellent   Patient Education Elevation, TKE, pillow placement, ROM to increase blood flow. REQUIRES PT FOLLOW UP Yes   Discharge Recommendations Home with assist PRN;Home with Home health PT   Activity Tolerance   Activity Tolerance Patient Tolerated treatment well   Plan   Times per week twice daily/ 5-6days/wk   Current Treatment Recommendations Strengthening;ROM;Balance Training;Functional Mobility Training;Transfer Training; Endurance Training;Gait Training;Stair training;Home Exercise Program;Safety Education & Training   Safety Devices   Type of devices All fall risk precautions in place;Call light within reach; Patient at risk for falls; Left in bed;Nurse notified;Gait belt     Nestor Lunch, PTA  Time: 7116-1112

## 2017-12-13 NOTE — PROGRESS NOTES
Department of Orthopedic Surgery  Progress Note    Subjective:       Systemic or Specific Complaints:No Complaints    Objective:     Patient Vitals for the past 24 hrs:   BP Temp Temp src Pulse Resp SpO2   12/13/17 1608 (!) 140/73 97.9 °F (36.6 °C) Oral 78 18 97 %   12/13/17 1109 (!) 146/68 97.7 °F (36.5 °C) Oral 71 18 96 %   12/13/17 0706 137/69 97.6 °F (36.4 °C) Oral 66 18 95 %   12/13/17 0326 134/69 98.1 °F (36.7 °C) Oral 61 18 96 %   12/12/17 2353 126/64 97.8 °F (36.6 °C) Oral 58 18 97 %   12/12/17 1853 120/65 98.3 °F (36.8 °C) Oral 60 18 97 %       General: appears stated age and cooperative   Wound: No Drainage   Motion: Painful range of Motion in affected extremity   DVT Exam: No evidence of DVT seen on physical exam.     Additional exam:      Data Review  CBC: Lab Results   Component Value Date    WBC 8.6 12/13/2017    RBC 5.29 12/13/2017    HGB 15.4 12/13/2017    HCT 45.8 12/13/2017     12/13/2017           Assessment:      left tka day 1.      Plan:      1:  tka protocal, awaiting Nucynta to arrive for pain  2:  Continue Deep venous thrombosis prophylaxis  3:  Continue Pain Control    Read Sebastianiche

## 2017-12-14 VITALS
RESPIRATION RATE: 20 BRPM | WEIGHT: 197 LBS | SYSTOLIC BLOOD PRESSURE: 134 MMHG | BODY MASS INDEX: 29.86 KG/M2 | DIASTOLIC BLOOD PRESSURE: 70 MMHG | OXYGEN SATURATION: 99 % | TEMPERATURE: 97.5 F | HEART RATE: 69 BPM | HEIGHT: 68 IN

## 2017-12-14 LAB
ABSOLUTE EOS #: 0.1 K/UL (ref 0–0.4)
ABSOLUTE IMMATURE GRANULOCYTE: ABNORMAL K/UL (ref 0–0.3)
ABSOLUTE LYMPH #: 1 K/UL (ref 1–4.8)
ABSOLUTE MONO #: 1.1 K/UL (ref 0.2–0.8)
ANION GAP SERPL CALCULATED.3IONS-SCNC: 9 MMOL/L (ref 9–17)
BASOPHILS # BLD: 0 % (ref 0–2)
BASOPHILS ABSOLUTE: 0 K/UL (ref 0–0.2)
BUN BLDV-MCNC: 15 MG/DL (ref 8–23)
BUN/CREAT BLD: 15 (ref 9–20)
CALCIUM SERPL-MCNC: 8.9 MG/DL (ref 8.6–10.4)
CHLORIDE BLD-SCNC: 106 MMOL/L (ref 98–107)
CO2: 26 MMOL/L (ref 20–31)
CREAT SERPL-MCNC: 0.97 MG/DL (ref 0.7–1.2)
DIFFERENTIAL TYPE: ABNORMAL
EOSINOPHILS RELATIVE PERCENT: 1 % (ref 1–4)
GFR AFRICAN AMERICAN: >60 ML/MIN
GFR NON-AFRICAN AMERICAN: >60 ML/MIN
GFR SERPL CREATININE-BSD FRML MDRD: ABNORMAL ML/MIN/{1.73_M2}
GFR SERPL CREATININE-BSD FRML MDRD: ABNORMAL ML/MIN/{1.73_M2}
GLUCOSE BLD-MCNC: 114 MG/DL (ref 70–99)
HCT VFR BLD CALC: 40.1 % (ref 41–53)
HEMOGLOBIN: 13.7 G/DL (ref 13.5–17.5)
IMMATURE GRANULOCYTES: ABNORMAL %
LYMPHOCYTES # BLD: 13 % (ref 24–44)
MCH RBC QN AUTO: 30.2 PG (ref 26–34)
MCHC RBC AUTO-ENTMCNC: 34.3 G/DL (ref 31–37)
MCV RBC AUTO: 88.2 FL (ref 80–100)
MONOCYTES # BLD: 14 % (ref 1–7)
PDW BLD-RTO: 14 % (ref 11.5–14.5)
PLATELET # BLD: 121 K/UL (ref 130–400)
PLATELET ESTIMATE: ABNORMAL
PMV BLD AUTO: 7.9 FL (ref 6–12)
POTASSIUM SERPL-SCNC: 3.6 MMOL/L (ref 3.7–5.3)
RBC # BLD: 4.55 M/UL (ref 4.5–5.9)
RBC # BLD: ABNORMAL 10*6/UL
SEG NEUTROPHILS: 72 % (ref 36–66)
SEGMENTED NEUTROPHILS ABSOLUTE COUNT: 5.4 K/UL (ref 1.8–7.7)
SODIUM BLD-SCNC: 141 MMOL/L (ref 135–144)
WBC # BLD: 7.6 K/UL (ref 3.5–11)
WBC # BLD: ABNORMAL 10*3/UL

## 2017-12-14 PROCEDURE — 97535 SELF CARE MNGMENT TRAINING: CPT

## 2017-12-14 PROCEDURE — 99232 SBSQ HOSP IP/OBS MODERATE 35: CPT | Performed by: INTERNAL MEDICINE

## 2017-12-14 PROCEDURE — 80048 BASIC METABOLIC PNL TOTAL CA: CPT

## 2017-12-14 PROCEDURE — 36415 COLL VENOUS BLD VENIPUNCTURE: CPT

## 2017-12-14 PROCEDURE — 6370000000 HC RX 637 (ALT 250 FOR IP): Performed by: INTERNAL MEDICINE

## 2017-12-14 PROCEDURE — 6370000000 HC RX 637 (ALT 250 FOR IP): Performed by: STUDENT IN AN ORGANIZED HEALTH CARE EDUCATION/TRAINING PROGRAM

## 2017-12-14 PROCEDURE — 97530 THERAPEUTIC ACTIVITIES: CPT

## 2017-12-14 PROCEDURE — 97116 GAIT TRAINING THERAPY: CPT

## 2017-12-14 PROCEDURE — 6370000000 HC RX 637 (ALT 250 FOR IP): Performed by: ORTHOPAEDIC SURGERY

## 2017-12-14 PROCEDURE — 85025 COMPLETE CBC W/AUTO DIFF WBC: CPT

## 2017-12-14 PROCEDURE — 97110 THERAPEUTIC EXERCISES: CPT

## 2017-12-14 RX ORDER — CELECOXIB 200 MG/1
200 CAPSULE ORAL 2 TIMES DAILY
Qty: 60 CAPSULE | Refills: 3 | Status: SHIPPED | OUTPATIENT
Start: 2017-12-14

## 2017-12-14 RX ORDER — PSEUDOEPHEDRINE HCL 30 MG
100 TABLET ORAL 2 TIMES DAILY
COMMUNITY
Start: 2017-12-14 | End: 2018-12-12 | Stop reason: ALTCHOICE

## 2017-12-14 RX ORDER — GABAPENTIN 100 MG/1
100 CAPSULE ORAL 3 TIMES DAILY
Qty: 90 CAPSULE | Refills: 3 | Status: SHIPPED | OUTPATIENT
Start: 2017-12-14

## 2017-12-14 RX ADMIN — HYDROCHLOROTHIAZIDE 25 MG: 25 TABLET ORAL at 08:35

## 2017-12-14 RX ADMIN — LOSARTAN POTASSIUM 100 MG: 100 TABLET, FILM COATED ORAL at 08:35

## 2017-12-14 RX ADMIN — TAPENTADOL HYDROCHLORIDE 50 MG: 50 TABLET, FILM COATED ORAL at 06:43

## 2017-12-14 RX ADMIN — ACETAMINOPHEN 1000 MG: 500 TABLET ORAL at 02:45

## 2017-12-14 RX ADMIN — FENOFIBRATE 160 MG: 160 TABLET ORAL at 08:35

## 2017-12-14 RX ADMIN — CELECOXIB 200 MG: 200 CAPSULE ORAL at 08:34

## 2017-12-14 RX ADMIN — GABAPENTIN 100 MG: 100 CAPSULE ORAL at 08:35

## 2017-12-14 RX ADMIN — AMLODIPINE BESYLATE 10 MG: 10 TABLET ORAL at 08:35

## 2017-12-14 ASSESSMENT — PAIN DESCRIPTION - LOCATION
LOCATION: KNEE
LOCATION: KNEE

## 2017-12-14 ASSESSMENT — PAIN DESCRIPTION - ORIENTATION
ORIENTATION: LEFT
ORIENTATION: LEFT

## 2017-12-14 ASSESSMENT — PAIN DESCRIPTION - PAIN TYPE
TYPE: SURGICAL PAIN
TYPE: SURGICAL PAIN

## 2017-12-14 ASSESSMENT — PAIN SCALES - GENERAL
PAINLEVEL_OUTOF10: 1
PAINLEVEL_OUTOF10: 2

## 2017-12-14 ASSESSMENT — PAIN DESCRIPTION - FREQUENCY: FREQUENCY: INTERMITTENT

## 2017-12-14 ASSESSMENT — PAIN DESCRIPTION - DESCRIPTORS: DESCRIPTORS: ACHING

## 2017-12-14 ASSESSMENT — PAIN DESCRIPTION - ONSET: ONSET: ON-GOING

## 2017-12-14 NOTE — PROGRESS NOTES
completion with good safety  Short term goal 3: demo S/MI with toileting routine  Short term goal 4: demo I with UB ADLs and min A with LB ADLs with good pacing and safety  Short term goal 5: verb good understanding of possible equip needs for home, fall prevention, and EC/WS techs for use with ADLs/mob  Patient Goals   Patient goals : to go home     Therapy Time   Individual Concurrent Group Co-treatment   Time In 1045         Time Out 1125         Minutes 435 Lifestyle Arvin So OTR/L

## 2017-12-14 NOTE — PROGRESS NOTES
goals   Time Frame for Short term goals 12 visits:   Short term goal 1 Pt. to be indep with bed mob. Short term goal 2 Pt. to be indep with sit to stand transfer with RW   Short term goal 3 Pt. to amb. 200 ft. with RW, SBA   Short term goal 4 Pt. to tolerate 25+ min., BID, for TKA ex. / gait/ functional mobility training   Short term goal 5 Pt. to safely negotiate full flight steps to prepare for d/c home and upstairs bedroom   Conditions Requiring Skilled Therapeutic Intervention   Body structures, Functions, Activity limitations Decreased functional mobility ; Decreased ADL status; Decreased ROM; Decreased strength;Decreased endurance;Decreased balance   Assessment Patient appropriate for Home d/c with HHPT . Prognosis Excellent   REQUIRES PT FOLLOW UP Yes   Discharge Recommendations Home with assist PRN;Home with Home health PT   Activity Tolerance   Activity Tolerance Patient Tolerated treatment well   Plan   Times per week twice daily/ 5-6days/wk   Current Treatment Recommendations Strengthening;ROM;Balance Training;Functional Mobility Training;Transfer Training; Endurance Training;Gait Training;Stair training;Home Exercise Program;Safety Education & Training   Safety Devices   Type of devices All fall risk precautions in place;Call light within reach; Patient at risk for falls; Left in bed;Nurse notified;Gait belt     Leandra Willams PTA  Time: [id number]

## 2017-12-14 NOTE — PROGRESS NOTES
Physical Therapy  Sondra Lackey   [id number]   Discharge Recommendation: Home with assist PRN, Home with Home health PT     12/14/17 1229   Restrictions/Precautions   Restrictions/Precautions General Precautions; Fall Risk   Position Activity Restriction   Other position/activity restrictions LT TKA/BID; [place]   General   Chart Reviewed Yes   Response To Previous Treatment Patient with no complaints from previous session. Family / Caregiver Present Yes  (Wife)   Subjective   Subjective Patient is agreeable for PT treatment. General Comment   Comments RN, Tina Shen reports patient is medically stable for PT treatment. Pain Screening   Patient Currently in Pain Yes   Oxygen Therapy   O2 Device None (Room air)   Bed Mobility   Bridging Stand by assistance   Rolling Stand by assistance   Supine to Sit Stand by assistance   Sit to Supine Stand by assistance   Scooting Stand by assistance   Transfers   Sit to Stand Contact guard assistance   Stand to sit Contact guard assistance   Stand Pivot Transfers Contact guard assistance   Lateral Transfers Contact guard assistance   Ambulation   Ambulation? Yes   Ambulation 1   Surface level tile   Device Rolling Walker   Assistance Stand by assistance   Quality of Gait good step through gait patttern   Distance 200' x 1   Stairs/Curb   Stairs? Yes   Stairs   # Steps  5   Stairs Height 6\"   Rails Bilateral   Device No Device   Assistance Stand by assistance   Comment Patient independently with good understanding    Balance   Posture Good   Sitting - Static Good   Sitting - Dynamic Good   Standing - Static Good  (w/ RW)   Standing - Dynamic Good;-  (w/ RW)   Exercises   Comments Seated troy LE AROM x 15 reps with rest breaks a needed. patient c/o quad pain with exercises. Other Activities   Comment Assisted patient with putting roberto carlos stockings on. All questions answered.     Patient Goals    Patient goals  full use of LE   Short term goals   Time Frame for Short term goals 15

## 2017-12-14 NOTE — DISCHARGE SUMMARY
Orthopaedic Discharge Summary     Patient ID:  Medina Yousif  [id number]  76 y.o.  1945    Admit date: 12/12/2017    Discharge date and time: 12/14/2017    Admitting Physician: Sherwin Meckel, DO     Discharge Physician: same    Admission Diagnoses: S/P total knee arthroplasty, left [Z96.652]    Discharge Diagnoses: left knee OA    Admission Condition: good    Discharged Condition: good    Indication for Admission: left knee OA    Surgical procedure: left total knee arthroplasty    Consults: IM    Significant Diagnostic Studies: labs: see chart    Hospital Course: Patient tolerated the procedure well. POD #1: Patient has no complaints. Pain is well controlled. No N/V/SOB/CP. Hgb 15.4. Vital signs stable. Neurocirc status checked and intact. Dressing clean and dry. POD #2: Patient pain is well controlled. No N/V/SOB/CP. Patient feels better this morning. Dressing clean and dry. Wound clean, dry and intact with no signs of infection. Neurocirc status checked and intact. Hgb: 13.7. Ok to discharge to home with OPPT. Med reconciliation completed. Disposition: home    Patient Instructions:   [unfilled]  Activity: activity as tolerated  Diet: regular diet  Wound Care: keep wound clean and dry    Follow-up with Sherwin Meckel, DO in 10-14 days.     Signed:  Electronically signed by Umang Lowery PA-C on 12/14/2017 at 8:15 AM

## 2017-12-14 NOTE — PROGRESS NOTES
[facility]    Progress Note    12/14/2017    8:42 AM    Name:   Carmen Bain  MRN:     [id number]     [place]side:      [de-identified]   Room:   [address] [place] [address] Day:  2  Admit Date:  12/12/2017  5:48 AM    PCP:   Colletta Pronto  Code Status:  Full Code    Subjective:     C/C: Knee pain, admitted for arthroplasty    Interval History Status: improved. Patient has no acute complaints. Pain is well controlled. He had an episode of hematuria yesterday for Vargas removal which is cleared. Brief History: This is a 77-year-old white male was admitted to [facility] after undergoing a left total knee arthroplasty for severe degenerative arthritis. We are following postoperative for medical management of hypertension. On December 12, 2017 he developed hematuria after Vargas removal.    Review of Systems:     Constitutional:  negative for chills, fevers, sweats  Respiratory:  negative for cough, dyspnea on exertion, shortness of breath, wheezing  Cardiovascular:  negative for chest pain, chest pressure/discomfort, lower extremity edema, palpitations  Gastrointestinal:  negative for abdominal pain, constipation, diarrhea, nausea, vomiting  Neurological:  negative for dizziness, headache    Medications: Allergies: Allergies   Allergen Reactions    Percocet [Oxycodone-Acetaminophen] Other (See Comments)     Does not like feeling in head. Cloudy feeling.  Vicodin [Hydrocodone-Acetaminophen] Other (See Comments)     Cloudiness, does not like feeling.        Current Meds:   Scheduled Meds:    sodium chloride flush  10 mL Intravenous 2 times per day    acetaminophen  1,000 mg Oral Q6H    docusate sodium  100 mg Oral BID    rivaroxaban  10 mg Oral Daily    gabapentin  100 mg Oral TID    celecoxib  200 mg Oral BID    fenofibrate  160 mg Oral Daily    losartan  100 mg Oral Daily    And    hydrochlorothiazide  25 mg Oral Daily    And    medications    [address] [facility], DO  12/14/2017  8:42 AM

## 2017-12-15 ENCOUNTER — CARE COORDINATION (OUTPATIENT)
Dept: CASE MANAGEMENT | Age: 72
End: 2017-12-15

## 2017-12-15 NOTE — CARE COORDINATION
Spoke with William    Incision status: Stated covered, denies s/s of infection. Edema/Swelling: Indicated increased swelling. Denies localized tenderness, redness or warmth to the leg. Stated he is using [employer] as directed. Pain level and status: Denies pain while taking pain medications. Use of pain medications: Indicated using. Bowels: Denies constipation. Home Care Agency active:     Outpatient therapy: [facility], first visit is today (12/15/2017) at 1400 hours. Do you have all of your medications: Indicated yes. Reported issue with not having prior auth Xarelto and paying OOP for medication. Changes in medications:     Call to Surgeon's office. Informed that office provides a sheet of paper giving a phone number for patient to call prior to surgery to obtain authorization for medication. Call to [facility] on [address] with Vanita Johnsonshawn. Informed that patient did not fill Rx at that location, but from what he can see Xarelto was not run through insurance. Call to [facility] on [facility]. Spoke with [employer]. Confirmed medication was not run through insurance. Per pharmacy, when she ran the medication through to his insurance it was denied - stating needed a PA. Call back to patient. Informed of what process is, inquired if patient/spouse recall getting a paper with a number to call for the Xarelto auth. Patient and spouse stated they have kept everything in the Ortho packet and while on the phone with Elissa Wadsworth went back through papers. Indicated they followed all directions highlighted in yellow. Patient stated he found the Xarelto paper in the last pocket. Stated he does not recall being told to call a number. Writer requested patient to still try and call the number on the form, but make sure he tells them the auth has to go back to 12/14/17.  Advised patient, per pharmacy, if authorization can be backdated they will be able to reimburse patient for difference in cost.

## 2018-02-09 ENCOUNTER — CARE COORDINATION (OUTPATIENT)
Dept: CASE MANAGEMENT | Age: 73
End: 2018-02-09

## 2018-02-09 NOTE — CARE COORDINATION
Attempt to reach patient for [facility] Care Transitions. [facility] requesting return call.  Contact information provided

## 2018-02-15 ENCOUNTER — CARE COORDINATION (OUTPATIENT)
Dept: CASE MANAGEMENT | Age: 73
End: 2018-02-15

## 2018-03-19 ENCOUNTER — CARE COORDINATION (OUTPATIENT)
Dept: CASE MANAGEMENT | Age: 73
End: 2018-03-19

## 2018-12-10 DIAGNOSIS — M25.561 ACUTE PAIN OF RIGHT KNEE: ICD-10-CM

## 2018-12-12 ENCOUNTER — HOSPITAL ENCOUNTER (OUTPATIENT)
Dept: GENERAL RADIOLOGY | Facility: CLINIC | Age: 73
Discharge: HOME | End: 2018-12-14
Payer: MEDICARE

## 2018-12-12 ENCOUNTER — OFFICE VISIT (OUTPATIENT)
Dept: ORTHOPEDIC SURGERY | Age: 73
End: 2018-12-12
Payer: MEDICARE

## 2018-12-12 VITALS
DIASTOLIC BLOOD PRESSURE: 69 MMHG | HEIGHT: 68 IN | HEART RATE: 56 BPM | SYSTOLIC BLOOD PRESSURE: 127 MMHG | WEIGHT: 200 LBS | BODY MASS INDEX: 30.31 KG/M2

## 2018-12-12 DIAGNOSIS — M25.562 CHRONIC PAIN OF LEFT KNEE: ICD-10-CM

## 2018-12-12 DIAGNOSIS — G89.29 CHRONIC PAIN OF LEFT KNEE: ICD-10-CM

## 2018-12-12 DIAGNOSIS — Z96.652 S/P TOTAL KNEE ARTHROPLASTY, LEFT: Primary | ICD-10-CM

## 2018-12-12 PROCEDURE — 73564 X-RAY EXAM KNEE 4 OR MORE: CPT

## 2018-12-12 PROCEDURE — 99203 OFFICE O/P NEW LOW 30 MIN: CPT | Performed by: PHYSICIAN ASSISTANT

## 2018-12-12 ASSESSMENT — ENCOUNTER SYMPTOMS
VOMITING: 0
ABDOMINAL PAIN: 0
CHEST TIGHTNESS: 0
DIARRHEA: 0
NAUSEA: 0
APNEA: 0
ABDOMINAL DISTENTION: 0
RESPIRATORY NEGATIVE: 1
COUGH: 0
CONSTIPATION: 0
COLOR CHANGE: 0
SHORTNESS OF BREATH: 0

## (undated) DEVICE — MEDI-VAC NON-CONDUCTIVE SUCTION TUBING: Brand: CARDINAL HEALTH

## (undated) DEVICE — ADHESIVE SKIN CLSR 0.7ML TOP DERMBND ADV

## (undated) DEVICE — BLADE SAW W12.5XL70MM THK0.8MM CUT THK1.12MM S STL RECIP

## (undated) DEVICE — SOLUTION IV 250ML 0.9% SOD CHL PH 5 INJ USP VIAFLX PLAS

## (undated) DEVICE — SPONGE LAP W18XL18IN WHT COT 4 PLY FLD STRUNG RADPQ DISP ST

## (undated) DEVICE — PRECISION FALCON OSCILLATING TIP SAW CARTRIDGE: Brand: PRECISION FALCON

## (undated) DEVICE — DRAPE,U/ SHT,SPLIT,PLAS,STERIL: Brand: MEDLINE

## (undated) DEVICE — GLOVE SURG SZ 85 L12IN FNGR THK87MIL WHT LTX FREE

## (undated) DEVICE — PEN: MARKING STD 100/CS: Brand: MEDICAL ACTION INDUSTRIES

## (undated) DEVICE — GLOVE SURG SZ 85 L12IN FNGR THK13MIL WHT ISOLEX POLYISOPRENE

## (undated) DEVICE — Device

## (undated) DEVICE — CEMENT MIXING SYSTEM WITH FEMORAL BREAKWAY NOZZLE: Brand: REVOLUTION

## (undated) DEVICE — 2108 SERIES SAGITTAL BLADE (19.5 X 1.27 X 81.0MM)

## (undated) DEVICE — CHLORAPREP 26ML ORANGE

## (undated) DEVICE — Z DISCONTINUED USE 2624852 GLOVE SURG 7 PF TEXT NEOPRNE BRN STRL NEOLON 2G LF

## (undated) DEVICE — GLOVE SURG SZ 7 L12IN FNGR THK87MIL WHT LTX FREE

## (undated) DEVICE — NEEDLE SPNL L3.5IN PNK HUB S STL REG WALL FIT STYL W/ QNCKE

## (undated) DEVICE — COVER,TABLE,HEAVY DUTY,50"X90",STRL: Brand: MEDLINE

## (undated) DEVICE — 3M™ WARMING BLANKET, UPPER BODY, 10 PER CASE, 42268: Brand: BAIR HUGGER™

## (undated) DEVICE — TRAY URIN CATH 16FR DRNGE BG STATLOK STBL DEV F SURSTP

## (undated) DEVICE — DRAPE,REIN 53X77,STERILE: Brand: MEDLINE

## (undated) DEVICE — BIPOLAR SEALER 23-112-1 AQM 6.0: Brand: AQUAMANTYS ®

## (undated) DEVICE — GLOVE SURG SZ 65 L12IN FNGR THK87MIL WHT LTX FREE

## (undated) DEVICE — SHEET, ORTHO, SPLIT, STERILE: Brand: MEDLINE

## (undated) DEVICE — SUTURE STRATAFIX SYMMETRIC PDS + SZ 1 L18IN ABSRB VLT L48MM SXPP1A400

## (undated) DEVICE — PAD COOL W10.9XL11.3IN UNIV REG HOSE WRP ON THER CLD

## (undated) DEVICE — PROTECTOR PRSS FOR PRSS

## (undated) DEVICE — ZIPPERED TOGA, 2X LARGE: Brand: FLYTE, SURGICOOL

## (undated) DEVICE — VISIONAIRE JOURNEY II CUTTING                                    BLOCK KIT- LEFT: Brand: VISIONAIRE

## (undated) DEVICE — COVER,MAYO STAND,XL,STERILE: Brand: MEDLINE